# Patient Record
Sex: MALE | Race: WHITE | NOT HISPANIC OR LATINO
[De-identification: names, ages, dates, MRNs, and addresses within clinical notes are randomized per-mention and may not be internally consistent; named-entity substitution may affect disease eponyms.]

---

## 2017-03-24 ENCOUNTER — RX ONLY (RX ONLY)
Age: 62
End: 2017-03-24

## 2017-03-24 ENCOUNTER — DOCTOR'S OFFICE (OUTPATIENT)
Dept: URBAN - NONMETROPOLITAN AREA CLINIC 1 | Facility: CLINIC | Age: 62
Setting detail: OPHTHALMOLOGY
End: 2017-03-24
Payer: COMMERCIAL

## 2017-03-24 DIAGNOSIS — E11.9: ICD-10-CM

## 2017-03-24 DIAGNOSIS — H52.13: ICD-10-CM

## 2017-03-24 DIAGNOSIS — H25.013: ICD-10-CM

## 2017-03-24 DIAGNOSIS — H44.30: ICD-10-CM

## 2017-03-24 PROCEDURE — 92134 CPTRZ OPH DX IMG PST SGM RTA: CPT | Performed by: OPHTHALMOLOGY

## 2017-03-24 PROCEDURE — 92014 COMPRE OPH EXAM EST PT 1/>: CPT | Performed by: OPHTHALMOLOGY

## 2017-03-24 ASSESSMENT — REFRACTION_MANIFEST
OD_VA1: 20/
OD_VA2: 20/
OS_VA1: 20/
OD_VA3: 20/
OD_VA1: 20/
OU_VA: 20/
OD_VA2: 20/
OD_VA1: 20/
OS_VA3: 20/
OS_VA2: 20/
OD_VA2: 20/
OS_VA1: 20/
OS_VA1: 20/
OD_VA3: 20/
OS_VA2: 20/
OD_VA3: 20/
OS_VA2: 20/
OS_VA3: 20/
OU_VA: 20/
OU_VA: 20/
OS_VA3: 20/

## 2017-03-24 ASSESSMENT — CONFRONTATIONAL VISUAL FIELD TEST (CVF)
OS_FINDINGS: FULL
OD_FINDINGS: FULL

## 2017-03-24 ASSESSMENT — REFRACTION_AUTOREFRACTION
OS_CYLINDER: -0.75
OD_CYLINDER: -0.50
OS_AXIS: 59
OS_SPHERE: +0.25
OD_AXIS: 114
OD_SPHERE: +0.75

## 2017-03-24 ASSESSMENT — REFRACTION_CURRENTRX
OS_OVR_VA: 20/
OS_ADD: +1.75
OS_OVR_VA: 20/
OD_OVR_VA: 20/
OD_ADD: +1.75
OS_OVR_VA: 20/
OD_OVR_VA: 20/
OD_OVR_VA: 20/

## 2017-03-24 ASSESSMENT — VISUAL ACUITY
OD_BCVA: 20/25+2
OS_BCVA: 20/25+2

## 2017-03-24 ASSESSMENT — SPHEQUIV_DERIVED
OD_SPHEQUIV: 0.5
OS_SPHEQUIV: -0.125

## 2018-06-12 ENCOUNTER — DOCTOR'S OFFICE (OUTPATIENT)
Dept: URBAN - NONMETROPOLITAN AREA CLINIC 1 | Facility: CLINIC | Age: 63
Setting detail: OPHTHALMOLOGY
End: 2018-06-12
Payer: COMMERCIAL

## 2018-06-12 DIAGNOSIS — E11.9: ICD-10-CM

## 2018-06-12 DIAGNOSIS — H44.30: ICD-10-CM

## 2018-06-12 DIAGNOSIS — H25.013: ICD-10-CM

## 2018-06-12 DIAGNOSIS — E11.3293: ICD-10-CM

## 2018-06-12 PROCEDURE — 92014 COMPRE OPH EXAM EST PT 1/>: CPT | Performed by: OPHTHALMOLOGY

## 2018-06-12 ASSESSMENT — REFRACTION_MANIFEST
OS_VA3: 20/
OU_VA: 20/
OS_VA3: 20/
OS_VA2: 20/
OS_VA1: 20/
OD_VA3: 20/
OD_VA1: 20/
OU_VA: 20/
OD_VA2: 20/
OD_VA2: 20/
OU_VA: 20/
OD_VA3: 20/
OD_VA1: 20/
OS_VA1: 20/
OS_VA2: 20/
OD_VA2: 20/
OS_VA2: 20/
OS_VA1: 20/
OS_VA3: 20/
OD_VA1: 20/
OD_VA3: 20/

## 2018-06-12 ASSESSMENT — SPHEQUIV_DERIVED
OD_SPHEQUIV: 0.5
OS_SPHEQUIV: -0.125

## 2018-06-12 ASSESSMENT — REFRACTION_CURRENTRX
OD_OVR_VA: 20/
OD_ADD: +1.75
OS_OVR_VA: 20/
OS_ADD: +1.75
OS_OVR_VA: 20/
OD_OVR_VA: 20/
OS_OVR_VA: 20/
OD_OVR_VA: 20/

## 2018-06-12 ASSESSMENT — REFRACTION_AUTOREFRACTION
OS_CYLINDER: -0.75
OD_CYLINDER: -0.50
OD_AXIS: 114
OS_SPHERE: +0.25
OS_AXIS: 59
OD_SPHERE: +0.75

## 2018-06-12 ASSESSMENT — VISUAL ACUITY
OD_BCVA: 20/25-2
OS_BCVA: 20/25-2

## 2018-06-12 ASSESSMENT — CONFRONTATIONAL VISUAL FIELD TEST (CVF)
OD_FINDINGS: FULL
OS_FINDINGS: FULL

## 2019-06-14 ENCOUNTER — DOCTOR'S OFFICE (OUTPATIENT)
Dept: URBAN - NONMETROPOLITAN AREA CLINIC 1 | Facility: CLINIC | Age: 64
Setting detail: OPHTHALMOLOGY
End: 2019-06-14
Payer: COMMERCIAL

## 2019-06-14 DIAGNOSIS — H44.30: ICD-10-CM

## 2019-06-14 DIAGNOSIS — H25.013: ICD-10-CM

## 2019-06-14 DIAGNOSIS — E11.9: ICD-10-CM

## 2019-06-14 PROCEDURE — 92014 COMPRE OPH EXAM EST PT 1/>: CPT | Performed by: OPHTHALMOLOGY

## 2019-06-14 ASSESSMENT — REFRACTION_MANIFEST
OD_VA1: 20/
OS_VA1: 20/
OU_VA: 20/
OD_VA2: 20/
OD_VA1: 20/
OS_VA3: 20/
OD_VA2: 20/
OS_VA2: 20/
OU_VA: 20/
OD_VA3: 20/
OS_VA2: 20/
OS_VA3: 20/
OS_VA1: 20/
OD_VA3: 20/

## 2019-06-14 ASSESSMENT — REFRACTION_CURRENTRX
OS_ADD: +1.75
OS_OVR_VA: 20/
OD_ADD: +1.75
OD_OVR_VA: 20/

## 2019-06-14 ASSESSMENT — REFRACTION_AUTOREFRACTION
OS_AXIS: 077
OD_AXIS: 077
OD_CYLINDER: -0.50
OS_CYLINDER: -0.75
OD_SPHERE: +1.00
OS_SPHERE: +1.00

## 2019-06-14 ASSESSMENT — SPHEQUIV_DERIVED
OS_SPHEQUIV: 0.625
OD_SPHEQUIV: 0.75

## 2019-06-14 ASSESSMENT — VISUAL ACUITY
OD_BCVA: 20/25-1
OS_BCVA: 20/30+1

## 2019-06-14 ASSESSMENT — CONFRONTATIONAL VISUAL FIELD TEST (CVF)
OD_FINDINGS: FULL
OS_FINDINGS: FULL

## 2020-09-22 ENCOUNTER — DOCTOR'S OFFICE (OUTPATIENT)
Dept: URBAN - NONMETROPOLITAN AREA CLINIC 1 | Facility: CLINIC | Age: 65
Setting detail: OPHTHALMOLOGY
End: 2020-09-22
Payer: COMMERCIAL

## 2020-09-22 DIAGNOSIS — E11.3293: ICD-10-CM

## 2020-09-22 DIAGNOSIS — H25.013: ICD-10-CM

## 2020-09-22 DIAGNOSIS — H44.30: ICD-10-CM

## 2020-09-22 PROCEDURE — 92134 CPTRZ OPH DX IMG PST SGM RTA: CPT | Performed by: OPHTHALMOLOGY

## 2020-09-22 PROCEDURE — 92014 COMPRE OPH EXAM EST PT 1/>: CPT | Performed by: OPHTHALMOLOGY

## 2020-09-22 ASSESSMENT — SPHEQUIV_DERIVED
OD_SPHEQUIV: 1
OS_SPHEQUIV: 0

## 2020-09-22 ASSESSMENT — CONFRONTATIONAL VISUAL FIELD TEST (CVF)
OD_FINDINGS: FULL
OS_FINDINGS: FULL

## 2020-09-22 ASSESSMENT — REFRACTION_AUTOREFRACTION
OD_CYLINDER: -1.50
OS_AXIS: 88
OS_CYLINDER: -1.00
OD_AXIS: 97
OD_SPHERE: +1.75
OS_SPHERE: +0.50

## 2020-09-22 ASSESSMENT — VISUAL ACUITY
OD_BCVA: 20/20-2
OS_BCVA: 20/40+2

## 2020-09-22 ASSESSMENT — REFRACTION_CURRENTRX
OS_OVR_VA: 20/
OD_ADD: +1.75
OS_ADD: +1.75
OD_OVR_VA: 20/

## 2021-03-24 ENCOUNTER — DOCTOR'S OFFICE (OUTPATIENT)
Dept: URBAN - NONMETROPOLITAN AREA CLINIC 1 | Facility: CLINIC | Age: 66
Setting detail: OPHTHALMOLOGY
End: 2021-03-24
Payer: COMMERCIAL

## 2021-03-24 DIAGNOSIS — E11.9: ICD-10-CM

## 2021-03-24 DIAGNOSIS — H35.373: ICD-10-CM

## 2021-03-24 DIAGNOSIS — H44.30: ICD-10-CM

## 2021-03-24 DIAGNOSIS — H25.013: ICD-10-CM

## 2021-03-24 PROCEDURE — 92014 COMPRE OPH EXAM EST PT 1/>: CPT | Performed by: OPHTHALMOLOGY

## 2021-03-24 PROCEDURE — 92134 CPTRZ OPH DX IMG PST SGM RTA: CPT | Performed by: OPHTHALMOLOGY

## 2021-03-24 ASSESSMENT — REFRACTION_AUTOREFRACTION
OS_CYLINDER: -1.00
OD_AXIS: 97
OD_CYLINDER: -1.50
OS_AXIS: 88
OS_SPHERE: +0.50
OD_SPHERE: +1.75

## 2021-03-24 ASSESSMENT — REFRACTION_CURRENTRX
OD_ADD: +1.75
OS_ADD: +1.75
OD_OVR_VA: 20/
OS_OVR_VA: 20/

## 2021-03-24 ASSESSMENT — SPHEQUIV_DERIVED
OS_SPHEQUIV: 0
OD_SPHEQUIV: 1

## 2021-03-24 ASSESSMENT — VISUAL ACUITY
OD_BCVA: 20/20-2
OS_BCVA: 20/40+1

## 2021-03-24 ASSESSMENT — CONFRONTATIONAL VISUAL FIELD TEST (CVF)
OS_FINDINGS: FULL
OD_FINDINGS: FULL

## 2021-09-29 ENCOUNTER — DOCTOR'S OFFICE (OUTPATIENT)
Dept: URBAN - NONMETROPOLITAN AREA CLINIC 1 | Facility: CLINIC | Age: 66
Setting detail: OPHTHALMOLOGY
End: 2021-09-29
Payer: COMMERCIAL

## 2021-09-29 DIAGNOSIS — H35.373: ICD-10-CM

## 2021-09-29 DIAGNOSIS — H44.30: ICD-10-CM

## 2021-09-29 DIAGNOSIS — E11.9: ICD-10-CM

## 2021-09-29 DIAGNOSIS — H25.013: ICD-10-CM

## 2021-09-29 PROCEDURE — 92014 COMPRE OPH EXAM EST PT 1/>: CPT | Performed by: OPHTHALMOLOGY

## 2021-09-29 PROCEDURE — 92134 CPTRZ OPH DX IMG PST SGM RTA: CPT | Performed by: OPHTHALMOLOGY

## 2021-09-29 ASSESSMENT — KERATOMETRY
OD_K2POWER_DIOPTERS: 38.25
OS_AXISANGLE_DEGREES: 116
OD_K1POWER_DIOPTERS: 37.50
OS_K2POWER_DIOPTERS: 39.25
OS_K1POWER_DIOPTERS: 38.50
OD_AXISANGLE_DEGREES: 041

## 2021-09-29 ASSESSMENT — SPHEQUIV_DERIVED
OS_SPHEQUIV: 0.25
OD_SPHEQUIV: 1.5

## 2021-09-29 ASSESSMENT — REFRACTION_CURRENTRX
OS_OVR_VA: 20/
OD_OVR_VA: 20/
OS_ADD: +1.75
OD_ADD: +1.75

## 2021-09-29 ASSESSMENT — REFRACTION_AUTOREFRACTION
OS_SPHERE: +1.25
OS_CYLINDER: -2.00
OD_CYLINDER: -1.50
OD_AXIS: 114
OD_SPHERE: +2.25
OS_AXIS: 042

## 2021-09-29 ASSESSMENT — AXIALLENGTH_DERIVED
OD_AL: 25.17
OS_AL: 25.3099

## 2021-09-29 ASSESSMENT — CONFRONTATIONAL VISUAL FIELD TEST (CVF)
OD_FINDINGS: FULL
OS_FINDINGS: FULL

## 2021-09-29 ASSESSMENT — VISUAL ACUITY
OD_BCVA: 20/20-2
OS_BCVA: 20/30+1

## 2022-01-18 ENCOUNTER — DOCTOR'S OFFICE (OUTPATIENT)
Dept: URBAN - NONMETROPOLITAN AREA CLINIC 1 | Facility: CLINIC | Age: 67
Setting detail: OPHTHALMOLOGY
End: 2022-01-18
Payer: COMMERCIAL

## 2022-01-18 DIAGNOSIS — H35.373: ICD-10-CM

## 2022-01-18 DIAGNOSIS — H25.013: ICD-10-CM

## 2022-01-18 DIAGNOSIS — E11.9: ICD-10-CM

## 2022-01-18 DIAGNOSIS — H44.30: ICD-10-CM

## 2022-01-18 PROCEDURE — 99214 OFFICE O/P EST MOD 30 MIN: CPT | Performed by: OPHTHALMOLOGY

## 2022-01-18 PROCEDURE — 92134 CPTRZ OPH DX IMG PST SGM RTA: CPT | Performed by: OPHTHALMOLOGY

## 2022-01-18 ASSESSMENT — REFRACTION_AUTOREFRACTION
OS_CYLINDER: -1.00
OS_SPHERE: +1.75
OD_CYLINDER: -1.75
OD_AXIS: 115
OD_SPHERE: +2.00
OS_AXIS: 179

## 2022-01-18 ASSESSMENT — KERATOMETRY
OS_AXISANGLE_DEGREES: 116
OS_K1POWER_DIOPTERS: 38.50
OD_AXISANGLE_DEGREES: 041
OD_K2POWER_DIOPTERS: 38.25
OD_K1POWER_DIOPTERS: 37.50
OS_K2POWER_DIOPTERS: 39.25

## 2022-01-18 ASSESSMENT — VISUAL ACUITY
OS_BCVA: 20/40-2
OD_BCVA: 20/25

## 2022-01-18 ASSESSMENT — REFRACTION_CURRENTRX
OD_ADD: +1.75
OS_ADD: +1.75
OD_OVR_VA: 20/
OS_OVR_VA: 20/

## 2022-01-18 ASSESSMENT — AXIALLENGTH_DERIVED
OD_AL: 25.3403
OS_AL: 24.8693

## 2022-01-18 ASSESSMENT — CONFRONTATIONAL VISUAL FIELD TEST (CVF)
OD_FINDINGS: FULL
OS_FINDINGS: FULL

## 2022-01-18 ASSESSMENT — SPHEQUIV_DERIVED
OS_SPHEQUIV: 1.25
OD_SPHEQUIV: 1.125

## 2022-03-15 ENCOUNTER — DOCTOR'S OFFICE (OUTPATIENT)
Dept: URBAN - NONMETROPOLITAN AREA CLINIC 1 | Facility: CLINIC | Age: 67
Setting detail: OPHTHALMOLOGY
End: 2022-03-15
Payer: COMMERCIAL

## 2022-03-15 DIAGNOSIS — H25.011: ICD-10-CM

## 2022-03-15 PROCEDURE — 92136 OPHTHALMIC BIOMETRY: CPT | Performed by: OPHTHALMOLOGY

## 2022-03-24 ENCOUNTER — AMBUL SURGICAL CARE (OUTPATIENT)
Dept: URBAN - NONMETROPOLITAN AREA SURGERY 1 | Facility: SURGERY | Age: 67
Setting detail: OPHTHALMOLOGY
End: 2022-03-24
Payer: COMMERCIAL

## 2022-03-24 DIAGNOSIS — H25.13: ICD-10-CM

## 2022-03-24 PROCEDURE — MISC CHARG MISC CHARGE: Performed by: OPHTHALMOLOGY

## 2022-03-31 ENCOUNTER — AMBUL SURGICAL CARE (OUTPATIENT)
Dept: URBAN - NONMETROPOLITAN AREA SURGERY 1 | Facility: SURGERY | Age: 67
Setting detail: OPHTHALMOLOGY
End: 2022-03-31
Payer: COMMERCIAL

## 2022-03-31 DIAGNOSIS — H25.041: ICD-10-CM

## 2022-03-31 DIAGNOSIS — H25.11: ICD-10-CM

## 2022-03-31 DIAGNOSIS — H25.011: ICD-10-CM

## 2022-03-31 PROCEDURE — G8907 PT DOC NO EVENTS ON DISCHARG: HCPCS | Performed by: OPHTHALMOLOGY

## 2022-03-31 PROCEDURE — 66984 XCAPSL CTRC RMVL W/O ECP: CPT | Performed by: OPHTHALMOLOGY

## 2022-03-31 PROCEDURE — V2788 PRESBYOPIA-CORRECT FUNCTION: HCPCS | Performed by: OPHTHALMOLOGY

## 2022-03-31 PROCEDURE — G8918 PT W/O PREOP ORDER IV AB PRO: HCPCS | Performed by: OPHTHALMOLOGY

## 2022-04-01 ENCOUNTER — RX ONLY (RX ONLY)
Age: 67
End: 2022-04-01

## 2022-04-01 ENCOUNTER — DOCTOR'S OFFICE (OUTPATIENT)
Dept: URBAN - NONMETROPOLITAN AREA CLINIC 1 | Facility: CLINIC | Age: 67
Setting detail: OPHTHALMOLOGY
End: 2022-04-01

## 2022-04-01 ENCOUNTER — DOCTOR'S OFFICE (OUTPATIENT)
Dept: URBAN - NONMETROPOLITAN AREA CLINIC 1 | Facility: CLINIC | Age: 67
Setting detail: OPHTHALMOLOGY
End: 2022-04-01
Payer: COMMERCIAL

## 2022-04-01 DIAGNOSIS — H25.012: ICD-10-CM

## 2022-04-01 DIAGNOSIS — Z96.1: ICD-10-CM

## 2022-04-01 PROCEDURE — 99024 POSTOP FOLLOW-UP VISIT: CPT | Performed by: OPTOMETRIST

## 2022-04-01 PROCEDURE — 92136 OPHTHALMIC BIOMETRY: CPT | Performed by: OPHTHALMOLOGY

## 2022-04-01 ASSESSMENT — KERATOMETRY
OS_K1POWER_DIOPTERS: 38.50
OD_K1POWER_DIOPTERS: 37.50
OS_K2POWER_DIOPTERS: 39.25
OS_AXISANGLE_DEGREES: 116
OD_K2POWER_DIOPTERS: 38.25
OD_AXISANGLE_DEGREES: 041

## 2022-04-01 ASSESSMENT — VISUAL ACUITY
OD_BCVA: 20/25
OS_BCVA: 20/30

## 2022-04-01 ASSESSMENT — AXIALLENGTH_DERIVED
OS_AL: 24.8693
OD_AL: 25.7399

## 2022-04-01 ASSESSMENT — REFRACTION_AUTOREFRACTION
OS_CYLINDER: -1.00
OS_AXIS: 179
OD_AXIS: 28
OD_SPHERE: +0.50
OS_SPHERE: +1.75
OD_CYLINDER: -0.50

## 2022-04-01 ASSESSMENT — REFRACTION_CURRENTRX
OD_ADD: +1.75
OD_OVR_VA: 20/
OS_ADD: +1.75
OS_OVR_VA: 20/

## 2022-04-01 ASSESSMENT — SPHEQUIV_DERIVED
OS_SPHEQUIV: 1.25
OD_SPHEQUIV: 0.25

## 2022-04-01 ASSESSMENT — TONOMETRY: OD_IOP_MMHG: 18

## 2022-04-07 ENCOUNTER — AMBUL SURGICAL CARE (OUTPATIENT)
Dept: URBAN - NONMETROPOLITAN AREA SURGERY 1 | Facility: SURGERY | Age: 67
Setting detail: OPHTHALMOLOGY
End: 2022-04-07
Payer: COMMERCIAL

## 2022-04-07 DIAGNOSIS — H25.042: ICD-10-CM

## 2022-04-07 DIAGNOSIS — H25.12: ICD-10-CM

## 2022-04-07 DIAGNOSIS — H25.012: ICD-10-CM

## 2022-04-07 PROCEDURE — G8918 PT W/O PREOP ORDER IV AB PRO: HCPCS | Performed by: OPHTHALMOLOGY

## 2022-04-07 PROCEDURE — V2788 PRESBYOPIA-CORRECT FUNCTION: HCPCS | Performed by: OPHTHALMOLOGY

## 2022-04-07 PROCEDURE — 66984 XCAPSL CTRC RMVL W/O ECP: CPT | Performed by: OPHTHALMOLOGY

## 2022-04-07 PROCEDURE — G8907 PT DOC NO EVENTS ON DISCHARG: HCPCS | Performed by: OPHTHALMOLOGY

## 2022-04-08 ENCOUNTER — DOCTOR'S OFFICE (OUTPATIENT)
Dept: URBAN - NONMETROPOLITAN AREA CLINIC 1 | Facility: CLINIC | Age: 67
Setting detail: OPHTHALMOLOGY
End: 2022-04-08

## 2022-04-08 DIAGNOSIS — Z96.1: ICD-10-CM

## 2022-04-08 PROBLEM — H25.012 CATARACT CORTICAL SENILE; LEFT EYE: Status: RESOLVED | Noted: 2022-04-01 | Resolved: 2022-04-08

## 2022-04-08 PROCEDURE — 99024 POSTOP FOLLOW-UP VISIT: CPT | Performed by: OPTOMETRIST

## 2022-04-08 ASSESSMENT — REFRACTION_CURRENTRX
OD_ADD: +1.75
OS_ADD: +1.75
OD_OVR_VA: 20/
OS_OVR_VA: 20/

## 2022-04-08 ASSESSMENT — KERATOMETRY
OS_AXISANGLE_DEGREES: 116
OS_K1POWER_DIOPTERS: 38.50
OS_K2POWER_DIOPTERS: 39.25
OD_K1POWER_DIOPTERS: 37.50
OD_AXISANGLE_DEGREES: 041
OD_K2POWER_DIOPTERS: 38.25

## 2022-04-08 ASSESSMENT — VISUAL ACUITY
OS_BCVA: 20/20-2
OD_BCVA: 20/30

## 2022-04-08 ASSESSMENT — TONOMETRY
OD_IOP_MMHG: 14
OS_IOP_MMHG: 16

## 2022-04-08 ASSESSMENT — REFRACTION_AUTOREFRACTION
OD_SPHERE: +0.50
OD_AXIS: 097
OS_CYLINDER: +0.75
OS_SPHERE: 0.00
OS_AXIS: 069
OD_CYLINDER: -0.50

## 2022-04-08 ASSESSMENT — AXIALLENGTH_DERIVED
OD_AL: 25.7399
OS_AL: 25.254

## 2022-04-08 ASSESSMENT — SPHEQUIV_DERIVED
OS_SPHEQUIV: 0.375
OD_SPHEQUIV: 0.25

## 2022-04-20 ENCOUNTER — DOCTOR'S OFFICE (OUTPATIENT)
Dept: URBAN - NONMETROPOLITAN AREA CLINIC 1 | Facility: CLINIC | Age: 67
Setting detail: OPHTHALMOLOGY
End: 2022-04-20
Payer: COMMERCIAL

## 2022-04-20 ENCOUNTER — RX ONLY (RX ONLY)
Age: 67
End: 2022-04-20

## 2022-04-20 DIAGNOSIS — H35.373: ICD-10-CM

## 2022-04-20 DIAGNOSIS — Z96.1: ICD-10-CM

## 2022-04-20 PROCEDURE — 99024 POSTOP FOLLOW-UP VISIT: CPT | Performed by: OPHTHALMOLOGY

## 2022-04-20 PROCEDURE — 92134 CPTRZ OPH DX IMG PST SGM RTA: CPT | Performed by: OPHTHALMOLOGY

## 2022-04-20 ASSESSMENT — SPHEQUIV_DERIVED
OD_SPHEQUIV: 0.375
OS_SPHEQUIV: -0.375

## 2022-04-20 ASSESSMENT — TONOMETRY
OD_IOP_MMHG: 16
OS_IOP_MMHG: 17

## 2022-04-20 ASSESSMENT — REFRACTION_CURRENTRX
OS_ADD: +1.75
OS_OVR_VA: 20/
OD_OVR_VA: 20/
OD_ADD: +1.75

## 2022-04-20 ASSESSMENT — REFRACTION_AUTOREFRACTION
OS_AXIS: 046
OS_CYLINDER: -1.25
OD_AXIS: 072
OD_SPHERE: +0.75
OS_SPHERE: +0.25
OD_CYLINDER: -0.75

## 2022-04-20 ASSESSMENT — AXIALLENGTH_DERIVED
OD_AL: 25.682
OS_AL: 25.5933

## 2022-04-20 ASSESSMENT — VISUAL ACUITY
OD_BCVA: 20/30-1
OS_BCVA: 20/25-2

## 2022-04-20 ASSESSMENT — KERATOMETRY
OS_K2POWER_DIOPTERS: 39.25
OS_AXISANGLE_DEGREES: 116
OD_K1POWER_DIOPTERS: 37.50
OS_K1POWER_DIOPTERS: 38.50
OD_K2POWER_DIOPTERS: 38.25
OD_AXISANGLE_DEGREES: 041

## 2022-06-07 ENCOUNTER — RX ONLY (RX ONLY)
Age: 67
End: 2022-06-07

## 2022-06-07 ENCOUNTER — DOCTOR'S OFFICE (OUTPATIENT)
Dept: URBAN - NONMETROPOLITAN AREA CLINIC 1 | Facility: CLINIC | Age: 67
Setting detail: OPHTHALMOLOGY
End: 2022-06-07
Payer: COMMERCIAL

## 2022-06-07 DIAGNOSIS — E11.9: ICD-10-CM

## 2022-06-07 DIAGNOSIS — H35.373: ICD-10-CM

## 2022-06-07 DIAGNOSIS — Z96.1: ICD-10-CM

## 2022-06-07 PROBLEM — H52.13 LASIK; BOTH EYES: Status: ACTIVE | Noted: 2017-03-24

## 2022-06-07 PROBLEM — H44.30 MYOPIC DEGENERATION: Status: ACTIVE | Noted: 2017-03-24

## 2022-06-07 PROCEDURE — 99024 POSTOP FOLLOW-UP VISIT: CPT | Performed by: OPHTHALMOLOGY

## 2022-06-07 ASSESSMENT — SPHEQUIV_DERIVED
OD_SPHEQUIV: 0.375
OS_SPHEQUIV: -0.75

## 2022-06-07 ASSESSMENT — TONOMETRY
OD_IOP_MMHG: 10
OS_IOP_MMHG: 10

## 2022-06-07 ASSESSMENT — KERATOMETRY
OS_K2POWER_DIOPTERS: 39.25
OD_K1POWER_DIOPTERS: 37.50
OD_K2POWER_DIOPTERS: 38.25
OD_AXISANGLE_DEGREES: 041
OS_K1POWER_DIOPTERS: 38.50
OS_AXISANGLE_DEGREES: 116

## 2022-06-07 ASSESSMENT — REFRACTION_CURRENTRX
OS_ADD: +1.75
OD_ADD: +1.75
OD_OVR_VA: 20/
OS_OVR_VA: 20/

## 2022-06-07 ASSESSMENT — REFRACTION_AUTOREFRACTION
OD_AXIS: 31
OS_SPHERE: -0.25
OS_AXIS: 49
OS_CYLINDER: -1.00
OD_CYLINDER: -0.25
OD_SPHERE: +0.50

## 2022-06-07 ASSESSMENT — AXIALLENGTH_DERIVED
OD_AL: 25.682
OS_AL: 25.7664

## 2022-06-07 ASSESSMENT — VISUAL ACUITY
OS_BCVA: 20/25-1
OD_BCVA: 20/25-2

## 2022-12-14 ENCOUNTER — DOCTOR'S OFFICE (OUTPATIENT)
Dept: URBAN - NONMETROPOLITAN AREA CLINIC 1 | Facility: CLINIC | Age: 67
Setting detail: OPHTHALMOLOGY
End: 2022-12-14
Payer: COMMERCIAL

## 2022-12-14 DIAGNOSIS — E11.3293: ICD-10-CM

## 2022-12-14 DIAGNOSIS — E11.9: ICD-10-CM

## 2022-12-14 DIAGNOSIS — H35.373: ICD-10-CM

## 2022-12-14 DIAGNOSIS — Z79.84: ICD-10-CM

## 2022-12-14 DIAGNOSIS — H44.30: ICD-10-CM

## 2022-12-14 PROCEDURE — 92134 CPTRZ OPH DX IMG PST SGM RTA: CPT | Performed by: OPHTHALMOLOGY

## 2022-12-14 PROCEDURE — 99214 OFFICE O/P EST MOD 30 MIN: CPT | Performed by: OPHTHALMOLOGY

## 2022-12-14 ASSESSMENT — TONOMETRY
OS_IOP_MMHG: 10
OD_IOP_MMHG: 10

## 2022-12-14 ASSESSMENT — KERATOMETRY
OD_AXISANGLE_DEGREES: 041
OS_AXISANGLE_DEGREES: 116
OD_K2POWER_DIOPTERS: 38.25
OD_K1POWER_DIOPTERS: 37.50
OS_K2POWER_DIOPTERS: 39.25
OS_K1POWER_DIOPTERS: 38.50

## 2022-12-14 ASSESSMENT — CONFRONTATIONAL VISUAL FIELD TEST (CVF)
OS_FINDINGS: FULL
OD_FINDINGS: FULL

## 2022-12-14 ASSESSMENT — REFRACTION_CURRENTRX
OS_ADD: +1.75
OD_ADD: +1.75
OS_OVR_VA: 20/
OD_OVR_VA: 20/

## 2022-12-14 ASSESSMENT — VISUAL ACUITY
OS_BCVA: 20/25-1
OD_BCVA: 20/30-2

## 2022-12-14 ASSESSMENT — REFRACTION_AUTOREFRACTION
OD_SPHERE: +1.00
OS_CYLINDER: -0.75
OD_AXIS: 21
OS_AXIS: 44
OD_CYLINDER: -0.50
OS_SPHERE: +0.25

## 2022-12-14 ASSESSMENT — SPHEQUIV_DERIVED
OD_SPHEQUIV: 0.75
OS_SPHEQUIV: -0.125

## 2022-12-14 ASSESSMENT — AXIALLENGTH_DERIVED
OD_AL: 25.51
OS_AL: 25.4792

## 2023-02-09 ENCOUNTER — DOCTOR'S OFFICE (OUTPATIENT)
Dept: URBAN - NONMETROPOLITAN AREA CLINIC 1 | Facility: CLINIC | Age: 68
Setting detail: OPHTHALMOLOGY
End: 2023-02-09
Payer: OTHER MISCELLANEOUS

## 2023-02-09 DIAGNOSIS — Z02.71: ICD-10-CM

## 2023-02-09 PROCEDURE — MEDICAL RE MEDICAL RECORDS: Performed by: OPHTHALMOLOGY

## 2023-06-20 ENCOUNTER — DOCTOR'S OFFICE (OUTPATIENT)
Dept: URBAN - NONMETROPOLITAN AREA CLINIC 1 | Facility: CLINIC | Age: 68
Setting detail: OPHTHALMOLOGY
End: 2023-06-20
Payer: COMMERCIAL

## 2023-06-20 DIAGNOSIS — Z79.84: ICD-10-CM

## 2023-06-20 DIAGNOSIS — H44.30: ICD-10-CM

## 2023-06-20 DIAGNOSIS — Z96.1: ICD-10-CM

## 2023-06-20 DIAGNOSIS — E11.3293: ICD-10-CM

## 2023-06-20 DIAGNOSIS — H35.373: ICD-10-CM

## 2023-06-20 DIAGNOSIS — E11.9: ICD-10-CM

## 2023-06-20 PROCEDURE — 99213 OFFICE O/P EST LOW 20 MIN: CPT | Performed by: OPHTHALMOLOGY

## 2023-06-20 PROCEDURE — 92134 CPTRZ OPH DX IMG PST SGM RTA: CPT | Performed by: OPHTHALMOLOGY

## 2023-06-20 ASSESSMENT — SPHEQUIV_DERIVED
OD_SPHEQUIV: 0.75
OS_SPHEQUIV: -0.125

## 2023-06-20 ASSESSMENT — REFRACTION_AUTOREFRACTION
OS_CYLINDER: -0.75
OS_AXIS: 44
OD_SPHERE: +1.00
OS_SPHERE: +0.25
OD_AXIS: 21
OD_CYLINDER: -0.50

## 2023-06-20 ASSESSMENT — REFRACTION_CURRENTRX
OD_ADD: +1.75
OS_ADD: +1.75
OS_OVR_VA: 20/
OD_OVR_VA: 20/

## 2023-06-20 ASSESSMENT — KERATOMETRY
OD_K1POWER_DIOPTERS: 37.50
OS_K2POWER_DIOPTERS: 39.25
OS_AXISANGLE_DEGREES: 116
OS_K1POWER_DIOPTERS: 38.50
OD_AXISANGLE_DEGREES: 041
OD_K2POWER_DIOPTERS: 38.25

## 2023-06-20 ASSESSMENT — AXIALLENGTH_DERIVED
OD_AL: 25.51
OS_AL: 25.4792

## 2023-06-20 ASSESSMENT — CONFRONTATIONAL VISUAL FIELD TEST (CVF)
OD_FINDINGS: FULL
OS_FINDINGS: FULL

## 2023-06-20 ASSESSMENT — VISUAL ACUITY
OS_BCVA: 20/25-1
OD_BCVA: 20/40-1

## 2023-09-13 ENCOUNTER — HOSPITAL ENCOUNTER (INPATIENT)
Facility: HOSPITAL | Age: 68
LOS: 1 days | Discharge: ANOTHER HOSPITAL | DRG: 872 | End: 2023-09-14
Attending: EMERGENCY MEDICINE | Admitting: ANESTHESIOLOGY
Payer: MEDICARE

## 2023-09-13 ENCOUNTER — APPOINTMENT (EMERGENCY)
Dept: RADIOLOGY | Facility: HOSPITAL | Age: 68
DRG: 872 | End: 2023-09-13
Payer: MEDICARE

## 2023-09-13 DIAGNOSIS — D70.9 NEUTROPENIC FEVER: Primary | ICD-10-CM

## 2023-09-13 DIAGNOSIS — R50.81 NEUTROPENIC FEVER: Primary | ICD-10-CM

## 2023-09-13 LAB
ALBUMIN SERPL BCP-MCNC: 3.5 G/DL (ref 3.5–5)
ALP SERPL-CCNC: 155 U/L (ref 34–104)
ALT SERPL W P-5'-P-CCNC: 22 U/L (ref 7–52)
ANION GAP SERPL CALCULATED.3IONS-SCNC: 9 MMOL/L
APTT PPP: 24 SECONDS (ref 23–37)
AST SERPL W P-5'-P-CCNC: 10 U/L (ref 13–39)
ATRIAL RATE: 103 BPM
BACTERIA UR QL AUTO: NORMAL /HPF
BILIRUB SERPL-MCNC: 0.54 MG/DL (ref 0.2–1)
BILIRUB UR QL STRIP: NEGATIVE
BNP SERPL-MCNC: 90 PG/ML (ref 0–100)
BUN SERPL-MCNC: 16 MG/DL (ref 5–25)
CALCIUM SERPL-MCNC: 8.5 MG/DL (ref 8.4–10.2)
CHLORIDE SERPL-SCNC: 103 MMOL/L (ref 96–108)
CLARITY UR: ABNORMAL
CO2 SERPL-SCNC: 22 MMOL/L (ref 21–32)
COLOR UR: YELLOW
CREAT SERPL-MCNC: 0.86 MG/DL (ref 0.6–1.3)
ERYTHROCYTE [DISTWIDTH] IN BLOOD BY AUTOMATED COUNT: 15.8 % (ref 11.6–15.1)
FLUAV RNA RESP QL NAA+PROBE: NEGATIVE
FLUBV RNA RESP QL NAA+PROBE: NEGATIVE
GFR SERPL CREATININE-BSD FRML MDRD: 90 ML/MIN/1.73SQ M
GLUCOSE SERPL-MCNC: 201 MG/DL (ref 65–140)
GLUCOSE UR STRIP-MCNC: ABNORMAL MG/DL
HCT VFR BLD AUTO: 24 % (ref 36.5–49.3)
HGB BLD-MCNC: 7.9 G/DL (ref 12–17)
HGB UR QL STRIP.AUTO: NEGATIVE
INR PPP: 1.04 (ref 0.84–1.19)
KETONES UR STRIP-MCNC: ABNORMAL MG/DL
LACTATE SERPL-SCNC: 2.5 MMOL/L (ref 0.5–2)
LACTATE SERPL-SCNC: 2.6 MMOL/L (ref 0.5–2)
LEUKOCYTE ESTERASE UR QL STRIP: NEGATIVE
MCH RBC QN AUTO: 31.2 PG (ref 26.8–34.3)
MCHC RBC AUTO-ENTMCNC: 32.9 G/DL (ref 31.4–37.4)
MCV RBC AUTO: 95 FL (ref 82–98)
NITRITE UR QL STRIP: NEGATIVE
NON-SQ EPI CELLS URNS QL MICRO: NORMAL /HPF
P AXIS: 19 DEGREES
PH UR STRIP.AUTO: 5.5 [PH]
PLATELET # BLD AUTO: 24 THOUSANDS/UL (ref 149–390)
PMV BLD AUTO: 12.5 FL (ref 8.9–12.7)
POTASSIUM SERPL-SCNC: 4 MMOL/L (ref 3.5–5.3)
PR INTERVAL: 170 MS
PROCALCITONIN SERPL-MCNC: 0.53 NG/ML
PROT SERPL-MCNC: 5.9 G/DL (ref 6.4–8.4)
PROT UR STRIP-MCNC: ABNORMAL MG/DL
PROTHROMBIN TIME: 14 SECONDS (ref 11.6–14.5)
QRS AXIS: -44 DEGREES
QRSD INTERVAL: 104 MS
QT INTERVAL: 386 MS
QTC INTERVAL: 505 MS
RBC # BLD AUTO: 2.53 MILLION/UL (ref 3.88–5.62)
RBC #/AREA URNS AUTO: NORMAL /HPF
RSV RNA RESP QL NAA+PROBE: NEGATIVE
SARS-COV-2 RNA RESP QL NAA+PROBE: NEGATIVE
SODIUM SERPL-SCNC: 134 MMOL/L (ref 135–147)
SP GR UR STRIP.AUTO: 1.01 (ref 1–1.03)
T WAVE AXIS: 40 DEGREES
UROBILINOGEN UR QL STRIP.AUTO: 1 E.U./DL
VENTRICULAR RATE: 103 BPM
WBC # BLD AUTO: 0.14 THOUSAND/UL (ref 4.31–10.16)
WBC #/AREA URNS AUTO: NORMAL /HPF

## 2023-09-13 PROCEDURE — 99285 EMERGENCY DEPT VISIT HI MDM: CPT | Performed by: EMERGENCY MEDICINE

## 2023-09-13 PROCEDURE — 87154 CUL TYP ID BLD PTHGN 6+ TRGT: CPT

## 2023-09-13 PROCEDURE — 99284 EMERGENCY DEPT VISIT MOD MDM: CPT

## 2023-09-13 PROCEDURE — 85610 PROTHROMBIN TIME: CPT

## 2023-09-13 PROCEDURE — 96366 THER/PROPH/DIAG IV INF ADDON: CPT

## 2023-09-13 PROCEDURE — 83880 ASSAY OF NATRIURETIC PEPTIDE: CPT | Performed by: EMERGENCY MEDICINE

## 2023-09-13 PROCEDURE — 87040 BLOOD CULTURE FOR BACTERIA: CPT

## 2023-09-13 PROCEDURE — 96365 THER/PROPH/DIAG IV INF INIT: CPT

## 2023-09-13 PROCEDURE — 36415 COLL VENOUS BLD VENIPUNCTURE: CPT

## 2023-09-13 PROCEDURE — 85730 THROMBOPLASTIN TIME PARTIAL: CPT

## 2023-09-13 PROCEDURE — 87077 CULTURE AEROBIC IDENTIFY: CPT

## 2023-09-13 PROCEDURE — 83605 ASSAY OF LACTIC ACID: CPT

## 2023-09-13 PROCEDURE — 84145 PROCALCITONIN (PCT): CPT

## 2023-09-13 PROCEDURE — 71045 X-RAY EXAM CHEST 1 VIEW: CPT

## 2023-09-13 PROCEDURE — 85007 BL SMEAR W/DIFF WBC COUNT: CPT

## 2023-09-13 PROCEDURE — 81001 URINALYSIS AUTO W/SCOPE: CPT

## 2023-09-13 PROCEDURE — 80053 COMPREHEN METABOLIC PANEL: CPT

## 2023-09-13 PROCEDURE — 85027 COMPLETE CBC AUTOMATED: CPT

## 2023-09-13 PROCEDURE — 96368 THER/DIAG CONCURRENT INF: CPT

## 2023-09-13 PROCEDURE — 96367 TX/PROPH/DG ADDL SEQ IV INF: CPT

## 2023-09-13 PROCEDURE — 0241U HB NFCT DS VIR RESP RNA 4 TRGT: CPT | Performed by: EMERGENCY MEDICINE

## 2023-09-13 PROCEDURE — 93005 ELECTROCARDIOGRAM TRACING: CPT

## 2023-09-13 PROCEDURE — 87186 SC STD MICRODIL/AGAR DIL: CPT

## 2023-09-13 RX ORDER — FLECAINIDE ACETATE 50 MG/1
150 TABLET ORAL ONCE
Status: COMPLETED | OUTPATIENT
Start: 2023-09-13 | End: 2023-09-13

## 2023-09-13 RX ORDER — CEFEPIME HYDROCHLORIDE 2 G/50ML
2000 INJECTION, SOLUTION INTRAVENOUS ONCE
Status: COMPLETED | OUTPATIENT
Start: 2023-09-13 | End: 2023-09-13

## 2023-09-13 RX ORDER — SODIUM CHLORIDE, SODIUM GLUCONATE, SODIUM ACETATE, POTASSIUM CHLORIDE, MAGNESIUM CHLORIDE, SODIUM PHOSPHATE, DIBASIC, AND POTASSIUM PHOSPHATE .53; .5; .37; .037; .03; .012; .00082 G/100ML; G/100ML; G/100ML; G/100ML; G/100ML; G/100ML; G/100ML
1000 INJECTION, SOLUTION INTRAVENOUS ONCE
Status: COMPLETED | OUTPATIENT
Start: 2023-09-13 | End: 2023-09-13

## 2023-09-13 RX ORDER — ACYCLOVIR 200 MG/1
800 CAPSULE ORAL DAILY
Status: DISCONTINUED | OUTPATIENT
Start: 2023-09-14 | End: 2023-09-14 | Stop reason: HOSPADM

## 2023-09-13 RX ORDER — ACETAMINOPHEN 325 MG/1
650 TABLET ORAL ONCE
Status: COMPLETED | OUTPATIENT
Start: 2023-09-13 | End: 2023-09-13

## 2023-09-13 RX ORDER — LISINOPRIL 2.5 MG/1
2.5 TABLET ORAL ONCE
Status: COMPLETED | OUTPATIENT
Start: 2023-09-13 | End: 2023-09-13

## 2023-09-13 RX ORDER — SODIUM CHLORIDE, SODIUM GLUCONATE, SODIUM ACETATE, POTASSIUM CHLORIDE, MAGNESIUM CHLORIDE, SODIUM PHOSPHATE, DIBASIC, AND POTASSIUM PHOSPHATE .53; .5; .37; .037; .03; .012; .00082 G/100ML; G/100ML; G/100ML; G/100ML; G/100ML; G/100ML; G/100ML
150 INJECTION, SOLUTION INTRAVENOUS CONTINUOUS
Status: DISCONTINUED | OUTPATIENT
Start: 2023-09-13 | End: 2023-09-14

## 2023-09-13 RX ORDER — ATENOLOL 25 MG/1
25 TABLET ORAL ONCE
Status: COMPLETED | OUTPATIENT
Start: 2023-09-13 | End: 2023-09-13

## 2023-09-13 RX ADMIN — FLECAINIDE ACETATE 150 MG: 50 TABLET ORAL at 21:34

## 2023-09-13 RX ADMIN — SODIUM CHLORIDE, SODIUM GLUCONATE, SODIUM ACETATE, POTASSIUM CHLORIDE, MAGNESIUM CHLORIDE, SODIUM PHOSPHATE, DIBASIC, AND POTASSIUM PHOSPHATE 150 ML/HR: .53; .5; .37; .037; .03; .012; .00082 INJECTION, SOLUTION INTRAVENOUS at 20:11

## 2023-09-13 RX ADMIN — SODIUM CHLORIDE 1000 ML: 0.9 INJECTION, SOLUTION INTRAVENOUS at 19:11

## 2023-09-13 RX ADMIN — VANCOMYCIN HYDROCHLORIDE 2000 MG: 1 INJECTION, POWDER, LYOPHILIZED, FOR SOLUTION INTRAVENOUS at 19:40

## 2023-09-13 RX ADMIN — LISINOPRIL 2.5 MG: 2.5 TABLET ORAL at 21:34

## 2023-09-13 RX ADMIN — ATENOLOL 25 MG: 25 TABLET ORAL at 21:34

## 2023-09-13 RX ADMIN — ASPIRIN 81 MG: 81 TABLET, COATED ORAL at 21:35

## 2023-09-13 RX ADMIN — SODIUM CHLORIDE, SODIUM GLUCONATE, SODIUM ACETATE, POTASSIUM CHLORIDE, MAGNESIUM CHLORIDE, SODIUM PHOSPHATE, DIBASIC, AND POTASSIUM PHOSPHATE 1000 ML: .53; .5; .37; .037; .03; .012; .00082 INJECTION, SOLUTION INTRAVENOUS at 21:11

## 2023-09-13 RX ADMIN — CEFEPIME HYDROCHLORIDE 2000 MG: 2 INJECTION, SOLUTION INTRAVENOUS at 19:27

## 2023-09-13 RX ADMIN — ACETAMINOPHEN 650 MG: 325 TABLET, FILM COATED ORAL at 19:14

## 2023-09-13 NOTE — ED PROVIDER NOTES
History  Chief Complaint   Patient presents with   • Fever     Fever 100-101, dizziness starting today. Patient is a 62-year-old currently being treated for lymphoma presenting to the emergency room with his significant other reporting the patient had a temperature of 103 degrees today. Patient is currently undergoing chemotherapy at [facility]. He has recently been admitted to that facility. Patient also has a history of atrial fibrillation, diabetes, hypertension. Until recently patient was also on steroid therapy. Patient was recently admitted to [facility] for fever as well. Patient also has lesions on the right side of his chest which were thought to be zoster according to the patient's significant other. Patient had 2 cultures which they report were negative. Patient however is still being treated with acyclovir. Currently emergency room patient appears somewhat ill but not acutely toxic. He is tachycardic and febrile. 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 No pertinent family history. I have reviewed and agree with the history as documented.     E-Cigarette/Vaping   • E-Cigarette Use Never User      E-Cigarette/Vaping Substances   • Nicotine No    • THC No    • CBD No    • Flavoring No    • Other No    • Unknown No      Social History     Tobacco Use   • Smoking status: Never   • Smokeless tobacco: Never   Vaping Use   • Vaping Use: Never used   Substance Use Topics   • Alcohol use: Yes     Comment: socially   • Drug use: Never       Review of Systems   Constitutional: Positive for chills. HENT: Negative. Negative for sore throat and trouble swallowing. Respiratory: Positive for cough. Cardiovascular: Negative. Negative for chest pain. Gastrointestinal: Negative for abdominal pain, diarrhea, nausea and vomiting. Genitourinary: Negative. Negative for dysuria. Musculoskeletal: Negative for myalgias. Skin: Positive for rash. Allergic/Immunologic: Positive for immunocompromised state. Psychiatric/Behavioral: Positive for confusion. Physical Exam  Physical Exam  Vitals and nursing note reviewed. Constitutional:       General: He is in acute distress. Appearance: Normal appearance. He is well-developed. He is not ill-appearing or toxic-appearing. HENT:      Head: Normocephalic and atraumatic. Hair is normal.      Jaw: No pain on movement. Right Ear: External ear normal.      Left Ear: External ear normal.      Nose: Nose normal. No congestion. Mouth/Throat:      Mouth: Mucous membranes are moist.   Eyes:      General: Lids are normal. No scleral icterus. Extraocular Movements: Extraocular movements intact. Conjunctiva/sclera: Conjunctivae normal.      Pupils: Pupils are equal, round, and reactive to light. Cardiovascular:      Rate and Rhythm: Regular rhythm. Tachycardia present. Heart sounds: Normal heart sounds. No murmur heard. Pulmonary:      Effort: Pulmonary effort is normal. No respiratory distress. Breath sounds: Examination of the left-lower field reveals rhonchi. Rhonchi present. No decreased breath sounds, wheezing or rales. Chest:       Abdominal:      General: Abdomen is flat. There is no distension. Palpations: Abdomen is soft. Abdomen is not rigid. Tenderness:  There is no abdominal tenderness. There is no guarding or rebound. Musculoskeletal:         General: No swelling, tenderness, deformity or signs of injury. Normal range of motion. Cervical back: Normal range of motion and neck supple. Skin:     General: Skin is warm and dry. Coloration: Skin is not pale. Findings: Rash present. Neurological:      General: No focal deficit present. Mental Status: He is alert and oriented to person, place, and time. Mental status is at baseline.    Psychiatric:         Attention and Perception: Attention normal.         Mood and Affect: Mood normal.         Speech: Speech normal.         Behavior: Behavior normal.         Vital Signs  ED Triage Vitals   Temperature Pulse Respirations Blood Pressure SpO2   09/13/23 1856 09/13/23 1855 09/13/23 1855 09/13/23 1856 09/13/23 1855   (!) 102.9 °F (39.4 °C) (!) 107 18 162/74 97 %      Temp Source Heart Rate Source Patient Position - Orthostatic VS BP Location FiO2 (%)   09/13/23 1856 09/13/23 1915 09/13/23 1915 09/13/23 1915 --   Oral Monitor Lying Right arm       Pain Score       09/13/23 1855       No Pain           Vitals:    09/13/23 2000 09/13/23 2015 09/13/23 2030 09/13/23 2045   BP: 138/63 144/64 153/70 153/72   Pulse: 100 100 105 103   Patient Position - Orthostatic VS: Lying Lying Lying Lying         Visual Acuity      ED Medications  Medications   multi-electrolyte (PLASMALYTE-A/ISOLYTE-S PH 7.4) IV solution (150 mL/hr Intravenous New Bag 9/13/23 2011)   vancomycin (VANCOCIN) 2,000 mg in sodium chloride 0.9 % 500 mL IVPB (2,000 mg Intravenous New Bag 9/13/23 1940)   multi-electrolyte (ISOLYTE-S PH 7.4) bolus 1,000 mL (has no administration in time range)   sodium chloride 0.9 % bolus 1,000 mL (0 mL Intravenous Stopped 9/13/23 2011)   acetaminophen (TYLENOL) tablet 650 mg (650 mg Oral Given 9/13/23 1914)   cefepime (MAXIPIME) IVPB (premix in dextrose) 2,000 mg 50 mL (0 mg Intravenous Stopped 9/13/23 1943) Diagnostic Studies  Results Reviewed     Procedure Component Value Units Date/Time    B-Type Natriuretic Peptide(BNP) [[id number]]     Lab Status: No result Specimen: Blood     Comprehensive metabolic panel [[id number]]  (Abnormal) Collected: 09/13/23 1908    Lab Status: Final result Specimen: Blood from Arm, Left Updated: 09/13/23 2009     Sodium 134 mmol/L      Potassium 4.0 mmol/L      Chloride 103 mmol/L      CO2 22 mmol/L      ANION GAP 9 mmol/L      BUN 16 mg/dL      Creatinine 0.86 mg/dL      Glucose 201 mg/dL      Calcium 8.5 mg/dL      AST 10 U/L      ALT 22 U/L      Alkaline Phosphatase 155 U/L      Total Protein 5.9 g/dL      Albumin 3.5 g/dL      Total Bilirubin 0.54 mg/dL      eGFR 90 ml/min/1.73sq m     Narrative:      [facility] guidelines for Chronic Kidney Disease (CKD):   •  Stage 1 with normal or high GFR (GFR > 90 mL/min/1.73 square meters)  •  Stage 2 Mild CKD (GFR = 60-89 mL/min/1.73 square meters)  •  Stage 3A Moderate CKD (GFR = 45-59 mL/min/1.73 square meters)  •  Stage 3B Moderate CKD (GFR = 30-44 mL/min/1.73 square meters)  •  Stage 4 Severe CKD (GFR = 15-29 mL/min/1.73 square meters)  •  Stage 5 End Stage CKD (GFR <15 mL/min/1.73 square meters)  Note: GFR calculation is accurate only with a steady state creatinine    Procalcitonin [[id number]]  (Abnormal) Collected: 09/13/23 1908    Lab Status: Final result Specimen: Blood from Arm, Left Updated: 09/13/23 2009     Procalcitonin 0.53 ng/ml     Urine Microscopic [[id number]]  (Normal) Collected: 09/13/23 1924    Lab Status: Final result Specimen: Urine, Clean Catch Updated: 09/13/23 2004     RBC, UA 0-1 /hpf      WBC, UA 0-1 /hpf      Epithelial Cells Occasional /hpf      Bacteria, UA Occasional /hpf     Lactic acid, plasma (w/reflex if result > 2.0) [[id number]]  (Abnormal) Collected: 09/13/23 1908    Lab Status: Final result Specimen: Blood from Arm, Left Updated: 09/13/23 2002     LACTIC ACID 2.6 mmol/L Narrative:      Result may be elevated if tourniquet was used during collection. Lactic acid 2 Hours [[id number]]     Lab Status: No result Specimen: Blood     CBC and differential [[id number]]  (Abnormal) Collected: 09/13/23 1908    Lab Status: Final result Specimen: Blood from Arm, Left Updated: 09/13/23 1959     WBC 0.14 Thousand/uL      RBC 2.53 Million/uL      Hemoglobin 7.9 g/dL      Hematocrit 24.0 %      MCV 95 fL      MCH 31.2 pg      MCHC 32.9 g/dL      RDW 15.8 %      MPV 12.5 fL      Platelets 24 Thousands/uL     Manual Differential(PHLEBS Do Not Order) [[id number]] Collected: 09/13/23 1908    Lab Status: In process Specimen: Blood from Arm, Left Updated: 09/13/23 1956    UA w Reflex to Microscopic w Reflex to Culture [[id number]]  (Abnormal) Collected: 09/13/23 1924    Lab Status: Final result Specimen: Urine, Clean Catch Updated: 09/13/23 1939     Color, UA Yellow     Clarity, UA Slightly Cloudy     Specific Gravity, UA 1.015     pH, UA 5.5     Leukocytes, UA Negative     Nitrite, UA Negative     Protein, UA Trace mg/dl      Glucose,  (1/4%) mg/dl      Ketones, UA Trace mg/dl      Urobilinogen, UA 1.0 E.U./dl      Bilirubin, UA Negative     Occult Blood, UA Negative    Protime-INR [[id number]]  (Normal) Collected: 09/13/23 1908    Lab Status: Final result Specimen: Blood from Arm, Left Updated: 09/13/23 1936     Protime 14.0 seconds      INR 1.04    APTT [[id number]]  (Normal) Collected: 09/13/23 1908    Lab Status: Final result Specimen: Blood from Arm, Left Updated: 09/13/23 1936     PTT 24 seconds     Blood culture #1 [[id number]] Collected: 09/13/23 1908    Lab Status: In process Specimen: Blood from Arm, Right Updated: 09/13/23 1916    Blood culture #2 [[id number]] Collected: 09/13/23 1908    Lab Status: In process Specimen: Blood from Arm, Left Updated: 09/13/23 1916    FLU/RSV/COVID - if FLU/RSV clinically relevant [[id number]] Collected: 09/13/23 1908    Lab Status:  In process Specimen: Nares from Nose Updated: 09/13/23 1916                 XR chest 1 view portable    (Results Pending)              Procedures  ECG 12 Lead Documentation Only    Date/Time: 9/13/2023 7:22 PM    Performed by: Vu Garner DO  Authorized by: Vu Garner DO    ECG reviewed by me, the ED Provider: yes    Patient location:  ED  Previous ECG:     Previous ECG:  Unavailable  Interpretation:     Interpretation: normal    Rate:     ECG rate assessment: tachycardic    Rhythm:     Rhythm: sinus tachycardia    Ectopy:     Ectopy: none    QRS:     QRS axis:  Normal  Conduction:     Conduction: normal    ST segments:     ST segments:  Non-specific  T waves:     T waves: non-specific    Other findings:     Other findings: prolonged qTc interval    Comments:      QTc on previously documented EKG was 465. ED Course  ED Course as of 09/13/23 2059   Wed Sep 13, 2023   1958 Has white blood cell count reported is 4.6 on 31 August.   2003 Patient will require admission. Patient will likely require services that are not readily available at our facility such as hematology oncology and onsite infectious disease. Discussed this with patient and his significant other. They are agreeable with transfer to [facility].   2039 8/31 hemoglobin was 8.8   2054 Spoke to hematology oncology fellow at [facility].  They agreed the patient should be transferred to that facility. We are awaiting to hear from medicine service. 2057 Patient's BMI > 30. For purposes of fluid resuscitation, IBW was utilized to calculate target volume to be administered. 2059 We will continue to volume resuscitate the patient. Patient was signed out to Dr. Vj Mendez 20yo+    Flowsheet Row Most Recent Value   Initial Alcohol Screen: US AUDIT-C     1. How often do you have a drink containing alcohol? 0 Filed at: 09/13/2023 1915   2.  How many drinks containing alcohol do you have on a typical day you are drinking? 0 Filed at: 09/13/2023 1915   3a. Male UNDER 65: How often do you have five or more drinks on one occasion? 0 Filed at: 09/13/2023 1858   3b. FEMALE Any Age, or MALE 65+: How often do you have 4 or more drinks on one occassion? 0 Filed at: 09/13/2023 1915   Audit-C Score 0 Filed at: 09/13/2023 9236   LILLY: How many times in the past year have you. .. Used an illegal drug or used a prescription medication for non-medical reasons? Never Filed at: 09/13/2023 4957                    Medical Decision Making  Patient presented to the emergency department and a MSE was performed. The patient was evaluated for complaint related to acute  fever. Patient is potentially at risk for, but not limited to, pneumonia, urinary tract infection, cholecystitis, appendicitis, diverticulitis,cellulitis, otitis media, strep pharyngitis, meningitis, neutropenic fever or uncomplicated viral related illness. Several of these diagnoses have been evaluated and ruled out by history and physical.  As needed, patient will be further evaluated with laboratory and imaging studies. Higher level diagnostics, such as CT imaging or ultrasound, may also be required. Please see work-up portion of the note for further evaluation of patient's risk. Socioeconomic factors were also considered as part of the decision-making process. Unless otherwise stated in the chart or patient is admitted as elsewhere documented, any previously prescribed medications will be maintained. Neutropenic fever ([address]): acute illness or injury  Amount and/or Complexity of Data Reviewed  Independent Historian: spouse     Details: [place] partner provides additional history. External Data Reviewed: labs and notes. Labs:  Decision-making details documented in ED Course. Radiology: ordered. ECG/medicine tests: ordered and independent interpretation performed. Decision-making details documented in ED Course.       Risk  Prescription drug management. Risk Details: Patient presented to the emergency department and a MSE was performed. The patient was evaluated and diagnosed with acute febrile illness in an immunocompromised patient with a white blood cell count of 0.14. This is a new issue that will require additional planned work-up and treatment in a hospitalized setting. As may have been required as part of this evaluation, clinical laboratory test, radiology imaging and medical testing (I.e. EKG) were ordered as necessitated by the patient's presentation. I independently reviewed these studies, imaging and testing. This patient's case is considered to be a considerable risk secondary to the above listed disease process and poses a threat to the patient's well-being and baseline function. Further in-patient diagnostic testing and management, which may include the administration of parenteral medications, is required. Pt required transfer to [facility] secondary to the need for continuity of care, hematology oncology services and infectious disease services. Patient presented to the ED and was found to be critically ill as demonstrated by the clinical history and primary physical evaluation. Pt had demonstrative findings and / or derangements of vital signs indicative for severe illness or injury. I personally performed bedside history and evaluation. Interventions to address these clinical needs were ordered/performed. These included, but not necessarily limited to, the ordering and subsequent review of lab studies, imaging and EKG. Please see chart with regards to specific resuscitative interventions and diagnostics. Due to a probability of clinically significant, life or limb threatening condition, the patient required my highest level of care, intervention and attention. I personally spent the documented time directly managing the patient.  The critical care time included obtaining a history, examining the patient, ordering and review of studies, arranging urgent treatment with development of a management plan, evaluation of patient's response to treatment, reassessment, and, if warranted, discussions with other providers or consultants. Documentation to the medical record for continuity of care was also required. Patients records pertinent to the emergent presenting condition were reviewed as available. Family was updated as available and appropriate. This critical care time was performed to assess and manage the high probability of imminent, life-threatening deterioration that could result in multi-organ failure if not addressed. It was exclusive of separately billable procedures and treating other patients and teaching time. Please see MDM section and the rest of the note for further information on patient assessment, reassessment, interventions and treatment. Total time was 33 mins exclusive of separate billable procedures. Disposition  Final diagnoses:   Neutropenic fever ([address])     Time reflects when diagnosis was documented in both MDM as applicable and the Disposition within this note     Time User Action Codes Description Comment    9/13/2023  7:58 PM Juan José Dueñas Add [D70.9,  R50.81] Neutropenic fever [facility])       ED Disposition     ED Disposition   Transfer to Another [address] [place] [address] of Network    Condition   --    Date/Time   Wed Sep 13, 2023  8:02 PM    Comment   Mel Letters should be transferred out to [facility].           Follow-up Information    None         Patient's Medications    No medications on file       No discharge procedures on file.     PDMP Review     None          ED Provider  Electronically Signed by           Kahlil Marin DO  09/13/23 2059

## 2023-09-14 VITALS
TEMPERATURE: 98.3 F | HEART RATE: 86 BPM | SYSTOLIC BLOOD PRESSURE: 120 MMHG | OXYGEN SATURATION: 97 % | BODY MASS INDEX: 34.97 KG/M2 | HEIGHT: 73 IN | WEIGHT: 263.89 LBS | DIASTOLIC BLOOD PRESSURE: 57 MMHG | RESPIRATION RATE: 18 BRPM

## 2023-09-14 PROBLEM — D70.9 NEUTROPENIC FEVER: Status: ACTIVE | Noted: 2023-09-14

## 2023-09-14 PROBLEM — B02.9 HERPES ZOSTER: Status: ACTIVE | Noted: 2023-09-14

## 2023-09-14 PROBLEM — E11.9 DM II (DIABETES MELLITUS, TYPE II), CONTROLLED (HCC): Status: ACTIVE | Noted: 2023-09-14

## 2023-09-14 PROBLEM — A41.9 SEPSIS (HCC): Status: ACTIVE | Noted: 2023-09-14

## 2023-09-14 PROBLEM — G62.9 POLYNEUROPATHY: Status: ACTIVE | Noted: 2023-09-14

## 2023-09-14 PROBLEM — C88.0 WALDENSTROM MACROGLOBULINEMIA (HCC): Status: ACTIVE | Noted: 2023-09-14

## 2023-09-14 PROBLEM — I10 HYPERTENSION: Status: ACTIVE | Noted: 2023-09-14

## 2023-09-14 PROBLEM — C83.30 DIFFUSE LARGE B CELL LYMPHOMA (HCC): Status: ACTIVE | Noted: 2023-09-14

## 2023-09-14 PROBLEM — I48.0 PAROXYSMAL ATRIAL FIBRILLATION (HCC): Status: ACTIVE | Noted: 2023-09-14

## 2023-09-14 PROBLEM — R50.81 NEUTROPENIC FEVER: Status: ACTIVE | Noted: 2023-09-14

## 2023-09-14 LAB
ALBUMIN SERPL BCP-MCNC: 2.9 G/DL (ref 3.5–5)
ALP SERPL-CCNC: 106 U/L (ref 34–104)
ALT SERPL W P-5'-P-CCNC: 14 U/L (ref 7–52)
ANION GAP SERPL CALCULATED.3IONS-SCNC: 6 MMOL/L
ANISOCYTOSIS BLD QL SMEAR: PRESENT
AST SERPL W P-5'-P-CCNC: 7 U/L (ref 13–39)
BASOPHILS # BLD MANUAL: 0 THOUSAND/UL (ref 0–0.1)
BASOPHILS NFR MAR MANUAL: 0 % (ref 0–1)
BILIRUB SERPL-MCNC: 0.41 MG/DL (ref 0.2–1)
BUN SERPL-MCNC: 17 MG/DL (ref 5–25)
BURR CELLS BLD QL SMEAR: PRESENT
CALCIUM ALBUM COR SERPL-MCNC: 8.9 MG/DL (ref 8.3–10.1)
CALCIUM SERPL-MCNC: 8 MG/DL (ref 8.4–10.2)
CHLORIDE SERPL-SCNC: 102 MMOL/L (ref 96–108)
CO2 SERPL-SCNC: 23 MMOL/L (ref 21–32)
CREAT SERPL-MCNC: 0.8 MG/DL (ref 0.6–1.3)
DACRYOCYTES BLD QL SMEAR: PRESENT
EOSINOPHIL # BLD MANUAL: 0.01 THOUSAND/UL (ref 0–0.4)
EOSINOPHIL NFR BLD MANUAL: 5 % (ref 0–6)
ERYTHROCYTE [DISTWIDTH] IN BLOOD BY AUTOMATED COUNT: 16.5 % (ref 11.6–15.1)
GFR SERPL CREATININE-BSD FRML MDRD: 93 ML/MIN/1.73SQ M
GLUCOSE SERPL-MCNC: 187 MG/DL (ref 65–140)
GLUCOSE SERPL-MCNC: 213 MG/DL (ref 65–140)
HCT VFR BLD AUTO: 18.8 % (ref 36.5–49.3)
HGB BLD-MCNC: 6 G/DL (ref 12–17)
LYMPHOCYTES # BLD AUTO: 0.08 THOUSAND/UL (ref 0.6–4.47)
LYMPHOCYTES # BLD AUTO: 59 % (ref 14–44)
MAGNESIUM SERPL-MCNC: 1.6 MG/DL (ref 1.9–2.7)
MCH RBC QN AUTO: 31.3 PG (ref 26.8–34.3)
MCHC RBC AUTO-ENTMCNC: 31.9 G/DL (ref 31.4–37.4)
MCV RBC AUTO: 98 FL (ref 82–98)
METAMYELOCYTES NFR BLD MANUAL: 1 % (ref 0–1)
MONOCYTES # BLD AUTO: 0.04 THOUSAND/UL (ref 0–1.22)
MONOCYTES NFR BLD: 28 % (ref 4–12)
NEUTROPHILS # BLD MANUAL: 0.01 THOUSAND/UL (ref 1.85–7.62)
NEUTS SEG NFR BLD AUTO: 7 % (ref 43–75)
NRBC BLD AUTO-RTO: 0 /100 WBCS
NRBC BLD AUTO-RTO: 3 /100 WBC (ref 0–2)
OVALOCYTES BLD QL SMEAR: PRESENT
PHOSPHATE SERPL-MCNC: 3.2 MG/DL (ref 2.3–4.1)
PLATELET # BLD AUTO: 25 THOUSANDS/UL (ref 149–390)
PLATELET BLD QL SMEAR: ABNORMAL
POIKILOCYTOSIS BLD QL SMEAR: PRESENT
POLYCHROMASIA BLD QL SMEAR: PRESENT
POTASSIUM SERPL-SCNC: 4 MMOL/L (ref 3.5–5.3)
PROCALCITONIN SERPL-MCNC: 6.14 NG/ML
PROT SERPL-MCNC: 5 G/DL (ref 6.4–8.4)
RBC # BLD AUTO: 1.92 MILLION/UL (ref 3.88–5.62)
RBC MORPH BLD: PRESENT
SCHISTOCYTES BLD QL SMEAR: PRESENT
SODIUM SERPL-SCNC: 131 MMOL/L (ref 135–147)
WBC # BLD AUTO: 0.31 THOUSAND/UL (ref 4.31–10.16)

## 2023-09-14 PROCEDURE — 96375 TX/PRO/DX INJ NEW DRUG ADDON: CPT

## 2023-09-14 PROCEDURE — 36415 COLL VENOUS BLD VENIPUNCTURE: CPT | Performed by: ANESTHESIOLOGY

## 2023-09-14 PROCEDURE — 80053 COMPREHEN METABOLIC PANEL: CPT | Performed by: ANESTHESIOLOGY

## 2023-09-14 PROCEDURE — 99284 EMERGENCY DEPT VISIT MOD MDM: CPT | Performed by: ANESTHESIOLOGY

## 2023-09-14 PROCEDURE — 82948 REAGENT STRIP/BLOOD GLUCOSE: CPT

## 2023-09-14 PROCEDURE — 96366 THER/PROPH/DIAG IV INF ADDON: CPT

## 2023-09-14 PROCEDURE — 96372 THER/PROPH/DIAG INJ SC/IM: CPT

## 2023-09-14 PROCEDURE — C9113 INJ PANTOPRAZOLE SODIUM, VIA: HCPCS | Performed by: EMERGENCY MEDICINE

## 2023-09-14 PROCEDURE — 84145 PROCALCITONIN (PCT): CPT | Performed by: ANESTHESIOLOGY

## 2023-09-14 PROCEDURE — 85027 COMPLETE CBC AUTOMATED: CPT | Performed by: ANESTHESIOLOGY

## 2023-09-14 PROCEDURE — 96368 THER/DIAG CONCURRENT INF: CPT

## 2023-09-14 PROCEDURE — 84100 ASSAY OF PHOSPHORUS: CPT | Performed by: ANESTHESIOLOGY

## 2023-09-14 PROCEDURE — 83735 ASSAY OF MAGNESIUM: CPT | Performed by: ANESTHESIOLOGY

## 2023-09-14 PROCEDURE — NC001 PR NO CHARGE: Performed by: ANESTHESIOLOGY

## 2023-09-14 RX ORDER — CEFEPIME HYDROCHLORIDE 2 G/50ML
2000 INJECTION, SOLUTION INTRAVENOUS EVERY 8 HOURS
Status: DISCONTINUED | OUTPATIENT
Start: 2023-09-14 | End: 2023-09-14 | Stop reason: HOSPADM

## 2023-09-14 RX ORDER — METRONIDAZOLE 500 MG/100ML
500 INJECTION, SOLUTION INTRAVENOUS EVERY 8 HOURS
Status: DISCONTINUED | OUTPATIENT
Start: 2023-09-14 | End: 2023-09-14 | Stop reason: HOSPADM

## 2023-09-14 RX ORDER — SIMVASTATIN 20 MG
20 TABLET ORAL
COMMUNITY
Start: 2023-05-01

## 2023-09-14 RX ORDER — PANTOPRAZOLE SODIUM 40 MG/10ML
40 INJECTION, POWDER, LYOPHILIZED, FOR SOLUTION INTRAVENOUS ONCE
Status: COMPLETED | OUTPATIENT
Start: 2023-09-14 | End: 2023-09-14

## 2023-09-14 RX ORDER — INSULIN GLARGINE 100 [IU]/ML
27 INJECTION, SOLUTION SUBCUTANEOUS ONCE
Status: DISCONTINUED | OUTPATIENT
Start: 2023-09-14 | End: 2023-09-14

## 2023-09-14 RX ORDER — FUROSEMIDE 10 MG/ML
20 INJECTION INTRAMUSCULAR; INTRAVENOUS ONCE
Status: DISCONTINUED | OUTPATIENT
Start: 2023-09-14 | End: 2023-09-14 | Stop reason: HOSPADM

## 2023-09-14 RX ORDER — CEFEPIME HYDROCHLORIDE 2 G/50ML
2000 INJECTION, SOLUTION INTRAVENOUS ONCE
Status: COMPLETED | OUTPATIENT
Start: 2023-09-14 | End: 2023-09-14

## 2023-09-14 RX ORDER — SENNOSIDES 8.6 MG
1 TABLET ORAL ONCE
Status: COMPLETED | OUTPATIENT
Start: 2023-09-14 | End: 2023-09-14

## 2023-09-14 RX ORDER — ACYCLOVIR 800 MG/1
800 TABLET ORAL 2 TIMES DAILY
COMMUNITY
Start: 2023-09-05 | End: 2023-09-14

## 2023-09-14 RX ORDER — INSULIN GLARGINE 100 [IU]/ML
28 INJECTION, SOLUTION SUBCUTANEOUS ONCE
Status: COMPLETED | OUTPATIENT
Start: 2023-09-14 | End: 2023-09-14

## 2023-09-14 RX ORDER — TAMSULOSIN HYDROCHLORIDE 0.4 MG/1
0.4 CAPSULE ORAL
Status: DISCONTINUED | OUTPATIENT
Start: 2023-09-14 | End: 2023-09-14

## 2023-09-14 RX ORDER — HEPARIN SODIUM 5000 [USP'U]/ML
5000 INJECTION, SOLUTION INTRAVENOUS; SUBCUTANEOUS EVERY 8 HOURS SCHEDULED
Status: DISCONTINUED | OUTPATIENT
Start: 2023-09-14 | End: 2023-09-14 | Stop reason: HOSPADM

## 2023-09-14 RX ORDER — ATENOLOL 25 MG/1
1 TABLET ORAL 2 TIMES DAILY
COMMUNITY
Start: 2023-08-11

## 2023-09-14 RX ORDER — PANTOPRAZOLE SODIUM 40 MG/1
1 TABLET, DELAYED RELEASE ORAL DAILY
COMMUNITY
Start: 2023-08-11

## 2023-09-14 RX ORDER — MAGNESIUM SULFATE HEPTAHYDRATE 40 MG/ML
2 INJECTION, SOLUTION INTRAVENOUS ONCE
Status: DISCONTINUED | OUTPATIENT
Start: 2023-09-14 | End: 2023-09-14 | Stop reason: HOSPADM

## 2023-09-14 RX ORDER — CHLORHEXIDINE GLUCONATE ORAL RINSE 1.2 MG/ML
15 SOLUTION DENTAL EVERY 12 HOURS SCHEDULED
Status: DISCONTINUED | OUTPATIENT
Start: 2023-09-14 | End: 2023-09-14 | Stop reason: HOSPADM

## 2023-09-14 RX ORDER — FLECAINIDE ACETATE 50 MG/1
150 TABLET ORAL ONCE
Status: COMPLETED | OUTPATIENT
Start: 2023-09-14 | End: 2023-09-14

## 2023-09-14 RX ORDER — FLECAINIDE ACETATE 150 MG/1
1 TABLET ORAL 2 TIMES DAILY
COMMUNITY
Start: 2023-08-11

## 2023-09-14 RX ORDER — AMOXICILLIN 250 MG
1 CAPSULE ORAL 2 TIMES DAILY
COMMUNITY
Start: 2023-08-05 | End: 2023-09-14

## 2023-09-14 RX ORDER — INSULIN GLARGINE 100 [IU]/ML
28 INJECTION, SOLUTION SUBCUTANEOUS
COMMUNITY
End: 2023-09-14

## 2023-09-14 RX ORDER — ASPIRIN 81 MG/1
81 TABLET ORAL DAILY
COMMUNITY

## 2023-09-14 RX ORDER — ATENOLOL 25 MG/1
25 TABLET ORAL ONCE
Status: COMPLETED | OUTPATIENT
Start: 2023-09-14 | End: 2023-09-14

## 2023-09-14 RX ORDER — LISINOPRIL 2.5 MG/1
2.5 TABLET ORAL DAILY
COMMUNITY
Start: 2023-08-06 | End: 2024-08-05

## 2023-09-14 RX ADMIN — STANDARDIZED SENNA CONCENTRATE 8.6 MG: 8.6 TABLET ORAL at 09:03

## 2023-09-14 RX ADMIN — ASPIRIN 81 MG: 81 TABLET, COATED ORAL at 09:03

## 2023-09-14 RX ADMIN — ATENOLOL 25 MG: 25 TABLET ORAL at 09:05

## 2023-09-14 RX ADMIN — CEFEPIME HYDROCHLORIDE 2000 MG: 2 INJECTION, SOLUTION INTRAVENOUS at 09:10

## 2023-09-14 RX ADMIN — PANTOPRAZOLE SODIUM 40 MG: 40 INJECTION, POWDER, FOR SOLUTION INTRAVENOUS at 09:04

## 2023-09-14 RX ADMIN — FLECAINIDE ACETATE 150 MG: 50 TABLET ORAL at 09:03

## 2023-09-14 RX ADMIN — ACYCLOVIR 800 MG: 200 CAPSULE ORAL at 09:02

## 2023-09-14 RX ADMIN — VANCOMYCIN HYDROCHLORIDE 1250 MG: 5 INJECTION, POWDER, LYOPHILIZED, FOR SOLUTION INTRAVENOUS at 10:05

## 2023-09-14 RX ADMIN — SODIUM CHLORIDE, SODIUM GLUCONATE, SODIUM ACETATE, POTASSIUM CHLORIDE, MAGNESIUM CHLORIDE, SODIUM PHOSPHATE, DIBASIC, AND POTASSIUM PHOSPHATE 150 ML/HR: .53; .5; .37; .037; .03; .012; .00082 INJECTION, SOLUTION INTRAVENOUS at 08:15

## 2023-09-14 RX ADMIN — INSULIN GLARGINE 28 UNITS: 100 INJECTION, SOLUTION SUBCUTANEOUS at 06:40

## 2023-09-14 NOTE — ASSESSMENT & PLAN NOTE
· Home regimen: Flecainide and atenolol  · Maintained on aspirin  · Not on any anticoagulation per patient    Plan   · continue flecainide  · Hold atenolol in setting of sepsis and hypotension  · Continue cardiopulmonary monitoring

## 2023-09-14 NOTE — ED NOTES
This RN just informed at 14:45 that patient is to be picked up at 15:15 to be transported to [facility]. Patient updated at this time.       Bao Fraire RN  09/14/23 3557

## 2023-09-14 NOTE — ASSESSMENT & PLAN NOTE
· POA, unknown source  · Criteria on admission: Febrile (102.9), tachypnea(20) tachycardia(100)   · IV fluid resuscitated with a liter of NSS  · Continue maintenance fluids  · Given Vanco and cefepime in the ED  · Lactic acid 2.6  · Will not trend this as it will likely be elevated in the setting of his cancer    Plan:   · Continue broad-spectrum antibiotics  · Blood cultures x2 pending  · Transfer to [facility] for continuity of care with oncology  · Trend fever and WBC curve  · Procal daily  · Discontinue maintenance fluids  · Monitor I/O

## 2023-09-14 NOTE — ASSESSMENT & PLAN NOTE
· Maintained on lisinopril    Plan:   · Hold antihypertensives in the setting of sepsis and hypotension  · Monitor blood pressures

## 2023-09-14 NOTE — RESULT ENCOUNTER NOTE
Patient transferred to [facility].   Attempted to contact providers at [facility].  Transfer center at [facility] stated that they would "try to put get in touch with them and call back."  I did ask for the number for the floor and was advised that that was not necessary

## 2023-09-14 NOTE — EMTALA/ACUTE CARE TRANSFER
[address] [address]  Dept: [phone]      EMTALA TRANSFER CONSENT    NAME Alexander Harman                                         1955                              MRN [id number]    I have been informed of my rights regarding examination, treatment, and transfer   by Dr. Priya Clark DO    Benefits: Specialized equipment and/or services available at the receiving facility (Include comment)________________________ ([facility] onc service)    Risks: Potential for delay in receiving treatment, Potential deterioration of medical condition, Loss of IV, Increased discomfort during transfer, Possible worsening of condition or death during transfer      Consent for Transfer:  I acknowledge that my medical condition has been evaluated and explained to me by the emergency department physician or other qualified medical person and/or my attending physician, who has recommended that I be transferred to the service of  Accepting Physician: Dr. Betzaida Wilson at [address] [place] [address] [address] Name, [address] : [facility]. The above potential benefits of such transfer, the potential risks associated with such transfer, and the probable risks of not being transferred have been explained to me, and I fully understand them. The doctor has explained that, in my case, the benefits of transfer outweigh the risks. I agree to be transferred. I authorize the performance of emergency medical procedures and treatments upon me in both transit and upon arrival at the receiving facility. Additionally, I authorize the release of any and all medical records to the receiving facility and request they be transported with me, if possible. I understand that the safest mode of transportation during a medical emergency is an ambulance and that the Hospital advocates the use of this mode of transport.  Risks of traveling to the receiving facility by car, including absence of medical control, life sustaining equipment, such as oxygen, and medical personnel has been explained to me and I fully understand them. (DENISE CORRECT BOX BELOW)  [  ]  I consent to the stated transfer and to be transported by ambulance/helicopter. [  ]  I consent to the stated transfer, but refuse transportation by ambulance and accept full responsibility for my transportation by car. I understand the risks of non-ambulance transfers and I exonerate the Hospital and its staff from any deterioration in my condition that results from this refusal.    X___________________________________________    DATE  23  TIME________  Signature of patient or legally responsible individual signing on patient behalf           RELATIONSHIP TO PATIENT_________________________          Provider Certification    NAME Azael Curry                                        [facility] 1955                              MRN [id number]    A medical screening exam was performed on the above named patient. Based on the examination:    Condition Necessitating Transfer The encounter diagnosis was Neutropenic fever ([address]).     Patient Condition: The patient has been stabilized such that within reasonable medical probability, no material deterioration of the patient condition or the condition of the unborn child(alicia) is likely to result from the transfer    Reason for Transfer:      Transfer Requirements: Facility [facility]   · Space available and qualified personnel available for treatment as acknowledged by    · Agreed to accept transfer and to provide appropriate medical treatment as acknowledged by       Dr. Kyrie Dunlap  · Appropriate medical records of the examination and treatment of the patient are provided at the time of transfer   [address] _______  · Transfer will be performed by qualified personnel from    and appropriate transfer equipment as required, including the use of necessary and appropriate life support measures. Provider Certification: I have examined the patient and explained the following risks and benefits of being transferred/refusing transfer to the patient/family:  General risk, such as traffic hazards, adverse weather conditions, rough terrain or turbulence, possible failure of equipment (including vehicle or aircraft), or consequences of actions of persons outside the control of the transport personnel, Unanticipated needs of medical equipment and personnel during transport, Risk of worsening condition, The possibility of a transport vehicle being unavailable      Based on these reasonable risks and benefits to the patient and/or the unborn child(alicia), and based upon the information available at the time of the patient’s examination, I certify that the medical benefits reasonably to be expected from the provision of appropriate medical treatments at another medical facility outweigh the increasing risks, if any, to the individual’s medical condition, and in the case of labor to the unborn child, from effecting the transfer.     X____________________________________________ DATE 09/13/23        TIME_______      ORIGINAL - SEND TO MEDICAL RECORDS   COPY - SEND WITH PATIENT DURING TRANSFER

## 2023-09-14 NOTE — ED NOTES
SBAR report called to NEFTALI Spear at [facility].       Leonela Pierre RN  09/14/23 0006

## 2023-09-14 NOTE — ED CARE HANDOFF
Emergency Department Sign Out Note        Sign out and transfer of care from [facility], [facility]. See Separate Emergency Department note. The patient, Carli Lockwood, was evaluated by the previous provider for admission and transfer to [facility]. Workup Completed:  Pending transfer    ED Course / Workup Pending (followup):   Monitor until bed is available                                     Procedures  MDM        Disposition  Final diagnoses:   Neutropenic fever ([address])     Time reflects when diagnosis was documented in both MDM as applicable and the Disposition within this note     Time User Action Codes Description Comment    9/13/2023  7:58 PM Chandrika Chapa Add [D70.9,  R50.81] Neutropenic fever [facility])       ED Disposition     ED Disposition   Transfer to Another [address] [place] [address] of [facility]    Condition   --    Date/Time   Wed Sep 13, 2023  8:02 PM    Comment   Carli Lockwood should be transferred out to [facility].           MD Documentation    [facility] Most Recent Value   Patient Condition The patient has been stabilized such that within reasonable medical probability, no material deterioration of the patient condition or the condition of the unborn child(alicia) is likely to result from the transfer   Benefits of Transfer Specialized equipment and/or services available at the receiving facility (Include comment)________________________  [facility]]   Risks of Transfer Potential for delay in receiving treatment, Potential deterioration of medical condition, Loss of IV, Increased discomfort during transfer, Possible worsening of condition or death during transfer   Accepting Physician Dr. Rosina Acosta [facility]   Sending MD [facility]   Provider Certification General risk, such as traffic hazards, adverse weather conditions, rough terrain or turbulence, possible failure of equipment (including vehicle or aircraft), or consequences of actions of persons outside the control of the transport personnel, Unanticipated needs of medical equipment and personnel during transport, Risk of worsening condition, The possibility of a transport vehicle being unavailable      RN Documentation    [address] Name, [address] [facility]  [facility]      Follow-up Information    None       Patient's Medications   Discharge Prescriptions    No medications on file     No discharge procedures on file.        ED Provider  Electronically Signed by     Avelina Barone DO  09/14/23 9838

## 2023-09-14 NOTE — PROGRESS NOTES
Carli Lockwood is a 74 y.o. male who is currently ordered Vancomycin IV with management by the Pharmacy Consult service. Relevant clinical data and objective / subjective history reviewed. Vancomycin Assessment:  Indication and Goal AUC/Trough: Bacteremia (goal -600, trough >10)  Clinical Status: new start  Micro:     Renal Function:  SCr: 0.86 mg/dL (most recent lab from 23)  CrCl: 116.2 mL/min  Renal replacement: Not on dialysis  Days of Therapy: 1  Current Dose: 2000 mg IV once  Vancomycin Plan:  New Dosin mg IV q 12 hours  Estimated AUC: 474 mcg*hr/mL  Estimated Trough: 15.3 mcg/mL  Next Level: 9/15/23 at 0600  Renal Function Monitoring: Daily BMP and [place] will continue to follow closely for s/sx of nephrotoxicity, infusion reactions and appropriateness of therapy. BMP and CBC will be ordered per protocol. We will continue to follow the patient’s culture results and clinical progress daily.     Leroy King, Pharmacist

## 2023-09-14 NOTE — ASSESSMENT & PLAN NOTE
· Temp on arrival: 102.9   · Significant other reported temp at home 103.4   · Patient was recently admitted to the hospital for Pseudomonas bacteremia charged on 8/26  · Since that discharge patient has received 1 more round of chemo on 9/5 which is port was accessed for  · At this time the port is currently not accessed  · Denies sick contacts or URI symptoms  · Denies nausea/vomiting/diarrhea  · Denies recent dental procedures and oral pain  · WBC: 0.14  · LA: 2.6 > 2.5   · Procal: 0.53 - 6.14  · UA: Negative for leukocytes and nitrates, trace ketones, occasional bacteria, 0-1 WBC  · Unknown source of infection  · CXR: No evidence of consolidation or infiltrate  · Could be secondary to chemotherapy treatment however, higher than the threshold that I would consider appropriate for such.     Plan:   · Neutropenic precautions  · Blood cultures x2  · Broaden antibiotics: Flagyl, cefepime, Vanco  · IVF Resuscitation: 1L   · Placed on maintenance IV fluids  · We will D/C these fluids given delusional anemia   · Continue antibiotics  · Trend procal  · Trend WBC and fever curve  · Plan to transfer to [facility] for continuity of care

## 2023-09-14 NOTE — ASSESSMENT & PLAN NOTE
Lab Results   Component Value Date    HGBA1C 6.9 (H) 05/25/2023       Recent Labs     09/14/23  0623   POCGLU 187*       Blood Sugar Average: Last 72 hrs:  (P) 187     · CHO Diet   · Accu checks AC/HS   · SSI   · Lantus if necessary

## 2023-09-14 NOTE — ASSESSMENT & PLAN NOTE
· Lesions under b/l breast that began as blisters   · Per ID at [facility] - felt it to be shingles. · Increased acyclovir dose 800mg   · Continue Acyclovir. · Lesions appear improved and dry/closed.

## 2023-09-14 NOTE — H&P
[address] [facility]  H&P  Name: Lamar Gamboa 74 y.o. male I MRN: [id number]  Unit/Bed#: ED 02 I Date of Admission: 9/13/2023   Date of Service: 9/14/2023 I Hospital Day: 0      Assessment/Plan   Neutropenic fever ([address] [facility])  Assessment & Plan  · Temp on arrival: 102.9   · Significant other reported temp at home 103.4   · Patient was recently admitted to the hospital for Pseudomonas bacteremia charged on 8/26  · Since that discharge patient has received 1 more round of chemo on 9/5 which is port was accessed for  · At this time the port is currently not accessed  · Denies sick contacts or URI symptoms  · Denies nausea/vomiting/diarrhea  · Denies recent dental procedures and oral pain  · WBC: 0.14  · LA: 2.6 > 2.5   · Procal: 0.53 - 6.14  · UA: Negative for leukocytes and nitrates, trace ketones, occasional bacteria, 0-1 WBC  · Unknown source of infection  · CXR: No evidence of consolidation or infiltrate  · Could be secondary to chemotherapy treatment however, higher than the threshold that I would consider appropriate for such.     Plan:   · Neutropenic precautions  · Blood cultures x2  · Broaden antibiotics: Flagyl, cefepime, Vanco  · IVF Resuscitation: 1L   · Placed on maintenance IV fluids  · We will D/C these fluids given delusional anemia   · Continue antibiotics  · Trend procal  · Trend WBC and fever curve  · Plan to transfer to [facility] for continuity of care    Sepsis [facility])  Assessment & Plan  · POA, unknown source  · Criteria on admission: Febrile (102.9), tachypnea(20) tachycardia(100)   · IV fluid resuscitated with a liter of NSS  · Continue maintenance fluids  · Given Vanco and cefepime in the ED  · Lactic acid 2.6  · Will not trend this as it will likely be elevated in the setting of his cancer    Plan:   · Continue broad-spectrum antibiotics  · Blood cultures x2 pending  · Transfer to [facility] for continuity of care with oncology  · Trend fever and WBC curve  · Procal daily  · Discontinue maintenance fluids  · Monitor I/O    Diffuse large B cell lymphoma (HCC)  Assessment & Plan  · Undergoing active Chemo  · Last treatment 9/5  · Following treatment he completed 5 days of prednisone 100 mg daily  · Follows with [facility] - Oncology - Plan for admission to [facility] for continuity of care. DM II (diabetes mellitus, type II), controlled ([address])  Assessment & Plan  Lab Results   Component Value Date    HGBA1C 6.9 (H) 05/25/2023       Recent Labs     09/14/23  0623   POCGLU 187*       Blood Sugar Average: Last 72 hrs:  (P) 187     · CHO Diet   · Accu checks AC/HS   · SSI   · Lantus if necessary     Polyneuropathy  Assessment & Plan  · Not currently maintained on any pharmacological intervention    Herpes zoster  Assessment & Plan  · Lesions under b/l breast that began as blisters   · Per ID at [facility] - felt it to be shingles. · Increased acyclovir dose 800mg   · Continue Acyclovir. · Lesions appear improved and dry/closed. Waldenstrom macroglobulinemia ([address])  Assessment & Plan  · Original cancer diagnosis  · Now diagnosed with diffuse type B lymphoma    Hypertension  Assessment & Plan  · Maintained on lisinopril    Plan:   · Hold antihypertensives in the setting of sepsis and hypotension  · Monitor blood pressures    Paroxysmal atrial fibrillation (HCC)  Assessment & Plan  · Home regimen: Flecainide and atenolol  · Maintained on aspirin  · Not on any anticoagulation per patient    Plan   · continue flecainide  · Hold atenolol in setting of sepsis and hypotension  · Continue cardiopulmonary monitoring           History of Present Illness     HPI: Luis Chandler is a 74 y.o. who presents with fever of 103.4 reported at home from significant other.   Patient's past medical history of type 2 diabetes, diffuse B-cell lymphoma (going active chemo), history of Waldnestrom  Macroglobinemia, paroxysmal A-fib not on anticoagulation, hypertension and recent admission for sepsis secondary to Pseudomonas bacteremia (discharged on 8/26). Patient denies upper respiratory symptoms or sick contacts. Denies nausea vomiting diarrhea. No recent dental procedures or oral pain. Denies abdominal pain. No clear infectious source. Septic criteria on admission to ED with decreased WBC count, febrile, tachypneic,and tachycardia. Fluid resuscitated with 1 L of IV fluids and given Vanco and cefepime in ED. patient is well-known to [facility] for his oncology treatments and the ask was to send him there for continuity of care. At initial conversation with the ER [facility] did not have any bed availability. Patient admitted to ICU stepdown level 1 for neutropenic fever. Antibiotics were broadened at that time. Blood cultures x2 pending. Prior to patient reaching the ICU [facility] called with a bed for the patient to transfer to. Patient was transferred to [facility]. Care team completed transfer and discharge note as well. Patient never actually came to the ICU. History obtained from chart review and the patient. Review of Systems   Constitutional:        Patient has no endorsing complaints. All other systems reviewed and are negative.     Historical Information   Past Medical History:  No date: Arthritis  No date: Atrial fibrillation (HCC)  No date: Diabetes mellitus ([address] )  No date: Hypertension  No date: Lymphoma ([address] [facility])  No date: Waldenstrom's disease [facility]) Past Surgical History:  11/17/2022: US GUIDED THYROID BIOPSY   Current Outpatient Medications   Medication Instructions   • aspirin (ECOTRIN LOW STRENGTH) 81 mg, Oral, Daily   • atenolol (TENORMIN) 25 mg tablet 1 tablet, 2 times daily   • flecainide (TAMBOCOR) 150 MG tablet 1 tablet, 2 times daily   • lisinopril (ZESTRIL) 2.5 mg, Oral, Daily   • pantoprazole (PROTONIX) 40 mg tablet 1 tablet, Oral, Daily   • simvastatin (ZOCOR) 20 mg, Oral    No Known Allergies   Social History     Tobacco Use   • Smoking status: Never   • Smokeless tobacco: Never   Vaping Use   • Vaping Use: Never used   Substance Use Topics   • Alcohol use: Yes     Comment: socially   • Drug use: Never    History reviewed. No pertinent family history. Objective                            Vitals I/O      Most Recent Min/Max in 24hrs   Temp 98.3 °F (36.8 °C) Temp  Min: 97.7 °F (36.5 °C)  Max: 102.9 °F (39.4 °C)   Pulse 86 Pulse  Min: 75  Max: 107   Resp 18 Resp  Min: 12  Max: 22   /57 BP  Min: 85/55  Max: 162/74   O2 Sat 97 % SpO2  Min: 92 %  Max: 98 %      Intake/Output Summary (Last 24 hours) at 2023 1548  Last data filed at 2023 1356  Gross per 24 hour   Intake 4550 ml   Output 100 ml   Net 4450 ml         Diet Cardiovascular; Cardiac     Invasive Monitoring Physical exam   None Physical Exam  Eyes:      Pupils: Pupils are equal, round, and reactive to light. Skin:     General: Skin is warm. Capillary Refill: Capillary refill takes less than 2 seconds. Coloration: Skin is pale. HENT:      Head: Normocephalic. Mouth/Throat:      Mouth: Mucous membranes are moist.   Cardiovascular:      Rate and Rhythm: Normal rate. Rhythm irregular. Pulses: Normal pulses. Musculoskeletal:      Right lower le+ Edema present. Left lower le+ Edema present. Abdominal:      Palpations: Abdomen is soft. Constitutional:       Appearance: He is well-nourished. Pulmonary:      Effort: Pulmonary effort is normal.      Breath sounds: Normal breath sounds. Neurological:      General: No focal deficit present. Mental Status: He is alert and oriented to person, place and time. Motor: Strength full and intact in all extremities. Diagnostic Studies      EKG: A-fib.   Rate 80s  Imaging:  I have personally reviewed pertinent films in PACS     Medications:  Scheduled PRN   acyclovir, 800 mg, Daily  cefepime, 2,000 mg, Q8H  chlorhexidine, 15 mL, Q12H BETTY  furosemide, 20 mg, Once  heparin (porcine), 5,000 Units, Q8H [facility]  magnesium sulfate, 2 g, Once  metroNIDAZOLE, 500 mg, Q8H  vancomycin, 1,250 mg, Q12H          Continuous          Labs:    CBC    Recent Labs     09/13/23 1908 09/14/23  1314   WBC 0.14* 0.31*   HGB 7.9* 6.0*   HCT 24.0* 18.8*   PLT 24* 25*     BMP    Recent Labs     09/13/23 1908 09/14/23  1314   SODIUM 134* 131*   K 4.0 4.0    102   CO2 22 23   AGAP 9 6   BUN 16 17   CREATININE 0.86 0.80   CALCIUM 8.5 8.0*       Coags    Recent Labs     09/13/23  1908   INR 1.04   PTT 24        Additional Electrolytes  Recent Labs     09/14/23  1314   MG 1.6*   PHOS 3.2          Blood Gas    No recent results  No recent results LFTs  Recent Labs     09/13/23 1908 09/14/23  1314   ALT 22 14   AST 10* 7*   ALKPHOS 155* 106*   ALB 3.5 2.9*   TBILI 0.54 0.41       Infectious  Recent Labs     09/13/23 1908 09/14/23  1314   PROCALCITONI 0.53* 6.14*     Glucose  Recent Labs     09/13/23  1908 09/14/23  1314   GLUC 201* 213*             Critical Care Time Delivered: Upon my evaluation, this patient had a high probability of imminent or life-threatening deterioration due to Neutropenic fever, sepsis, and diffuse B-cell lymphoma, which required my direct attention, intervention, and personal management. I have personally provided 15 minutes of critical care time, exclusive of procedures, teaching, family meetings, and any prior time recorded by providers other than myself.    Anticipated Length of Stay is > 2 midnights  [facility]

## 2023-09-14 NOTE — ED NOTES
Patient informed this RN that hemoglobin is always on the lower side. Hemoglobin w/ lab work last PM was 7.9 which patient said is his baseline. Patient had been receiving maintenance fluids at 150 mL/hr per provider orders while awaiting bed at [facility] since last PM. Repeat CBC this afternoon showed a hemoglobin of 6.0. Provider, Lisette Dong, aware and acknowledged critical lab value via [place] Text from . This RN informed provider that patient is asymptomatic and has gotten fluids since last PM. Provider agrees hemoglobin is probably low d/t being diluted. Fluids stopped per order and repeat CBC to be obtained later today per provider.       Chava Moyer RN  09/14/23 6345

## 2023-09-14 NOTE — ED CARE HANDOFF
Emergency Department Sign Out Note        Sign out and transfer of care from Dr. Lobo Kumar. See Separate Emergency Department note. The patient, Bob Cuevas, was evaluated by the previous provider for fever. Workup Completed:  Labs Reviewed   CBC AND DIFFERENTIAL - Abnormal       Result Value Ref Range Status    WBC 0.14 (*) 4.31 - 10.16 Thousand/uL Final    RBC 2.53 (*) 3.88 - 5.62 Million/uL Final    Hemoglobin 7.9 (*) 12.0 - 17.0 g/dL Final    Hematocrit 24.0 (*) 36.5 - 49.3 % Final    MCV 95  82 - 98 fL Final    MCH 31.2  26.8 - 34.3 pg Final    MCHC 32.9  31.4 - 37.4 g/dL Final    RDW 15.8 (*) 11.6 - 15.1 % Final    MPV 12.5  8.9 - 12.7 fL Final    Platelets 24 (*) 551 - 390 Thousands/uL Final   COMPREHENSIVE METABOLIC PANEL - Abnormal    Sodium 134 (*) 135 - 147 mmol/L Final    Potassium 4.0  3.5 - 5.3 mmol/L Final    Chloride 103  96 - 108 mmol/L Final    CO2 22  21 - 32 mmol/L Final    ANION GAP 9  mmol/L Final    BUN 16  5 - 25 mg/dL Final    Creatinine 0.86  0.60 - 1.30 mg/dL Final    Comment: Standardized to IDMS reference method    Glucose 201 (*) 65 - 140 mg/dL Final    Comment: If the patient is fasting, the ADA then defines impaired fasting glucose as > 100 mg/dL and diabetes as > or equal to 123 mg/dL. Calcium 8.5  8.4 - 10.2 mg/dL Final    AST 10 (*) 13 - 39 U/L Final    ALT 22  7 - 52 U/L Final    Comment: Specimen collection should occur prior to Sulfasalazine administration due to the potential for falsely depressed results. Alkaline Phosphatase 155 (*) 34 - 104 U/L Final    Total Protein 5.9 (*) 6.4 - 8.4 g/dL Final    Albumin 3.5  3.5 - 5.0 g/dL Final    Total Bilirubin 0.54  0.20 - 1.00 mg/dL Final    Comment: Use of this assay is not recommended for patients undergoing treatment with eltrombopag due to the potential for falsely elevated results.   N-acetyl-p-benzoquinone imine (metabolite of Acetaminophen) will generate erroneously low results in samples for patients that have taken an overdose of Acetaminophen. eGFR 90  ml/min/1.73sq m Final    Narrative:     [facility] guidelines for Chronic Kidney Disease (CKD):   •  Stage 1 with normal or high GFR (GFR > 90 mL/min/1.73 square meters)  •  Stage 2 Mild CKD (GFR = 60-89 mL/min/1.73 square meters)  •  Stage 3A Moderate CKD (GFR = 45-59 mL/min/1.73 square meters)  •  Stage 3B Moderate CKD (GFR = 30-44 mL/min/1.73 square meters)  •  Stage 4 Severe CKD (GFR = 15-29 mL/min/1.73 square meters)  •  Stage 5 End Stage CKD (GFR <15 mL/min/1.73 square meters)  Note: GFR calculation is accurate only with a steady state creatinine   LACTIC ACID, PLASMA (W/REFLEX IF RESULT > 2.0) - Abnormal    LACTIC ACID 2.6 (*) 0.5 - 2.0 mmol/L Final    Narrative:     Result may be elevated if tourniquet was used during collection. PROCALCITONIN TEST - Abnormal    Procalcitonin 0.53 (*) <=0.25 ng/ml Final    Comment: Suspected Lower Respiratory Tract Infection (LRTI):  - LESS than or EQUAL to 0.25 ng/mL:   low likelihood for bacterial LRTI; antibiotics DISCOURAGED.  - GREATER than 0.25 ng/mL:   increased likelihood for bacterial LRTI; antibiotics ENCOURAGED. Suspected Sepsis:  - Strongly consider initiating antibiotics in ALL UNSTABLE patients. - LESS than or EQUAL to 0.5 ng/mL:   low likelihood for bacterial sepsis; antibiotics DISCOURAGED.  - GREATER than 0.5 ng/mL:   increased likelihood for bacterial sepsis; antibiotics ENCOURAGED.  - GREATER than 2 ng/mL:   high risk for severe sepsis / septic shock; antibiotics strongly ENCOURAGED. Decisions on antibiotic use should not be based solely on Procalcitonin (PCT) levels. If PCT is low but uncertainty exists with stopping antibiotics, repeat PCT in 6-24 hours to confirm the low level.  If antibiotics are administered (regardless if initial PCT was high or low), repeat PCT every 1-2 days to consider early antibiotic cessation (when GREATER than 80% decrease from the peak OR when PCT drops below designated cutoffs, whichever comes first), so long as the infection is NOT one that typically requires prolonged treatment durations (e.g., bone/joint infections, endocarditis, Staph. aureus bacteremia).     Situations of FALSE-POSITIVE Procalcitonin values:  1) Newborns < 67 hours old  2) Massive stress from severe trauma / burns, major surgery, acute pancreatitis, cardiogenic / hemorrhagic shock, sickle cell crisis, or other organ perfusion abnormalities  3) Malaria and some Candidal infections  4) Treatment with agents that stimulate cytokines (e.g., OKT3, anti-lymphocyte globulins, alemtuzumab, IL-2, granulocyte transfusion [NOT GCSFs])  5) Chronic renal disease causes elevated baseline levels (consider GREATER than 0.75 ng/mL as an abnormal cut-off); initiating HD/CRRT may cause transient decreases  6) Paraneoplastic syndromes from medullary thyroid or SCLC, some forms of vasculitis, and acute agsma-hz-bvrl disease    Situations of FALSE-NEGATIVE Procalcitonin values:  1) Too early in clinical course for PCT to have reached its peak (may repeat in 6-24 hours to confirm low level)  2) Localized infection WITHOUT systemic (SIRS / sepsis) response (e.g., an abscess, osteomyelitis, cystitis)  3) Mycobacteria (e.g., Tuberculosis, MAC)  4) Cystic fibrosis exacerbations     UA W REFLEX TO MICROSCOPIC WITH REFLEX TO CULTURE - Abnormal    Color, UA Yellow   Final    Clarity, UA Slightly Cloudy   Final    Specific Gravity, UA 1.015  1.003 - 1.030 Final    pH, UA 5.5  4.5, 5.0, 5.5, 6.0, 6.5, 7.0, 7.5, 8.0 Final    Leukocytes, UA Negative  Negative Final    Nitrite, UA Negative  Negative Final    Protein, UA Trace (*) Negative mg/dl Final    Glucose,  (1/4%) (*) Negative mg/dl Final    Ketones, UA Trace (*) Negative mg/dl Final    Urobilinogen, UA 1.0  0.2, 1.0 E.U./dl E.U./dl Final    Bilirubin, UA Negative  Negative Final    Occult Blood, UA Negative  Negative Final   COVID19, INFLUENZA A/B, RSV PCR, [facility] - Normal    SARS-CoV-2 Negative  Negative Final    INFLUENZA A PCR Negative  Negative Final    INFLUENZA B PCR Negative  Negative Final    RSV PCR Negative  Negative Final    Narrative:     FOR PEDIATRIC PATIENTS - copy/paste COVID Guidelines URL to browser: [email or web address]    SARS-CoV-2 assay is a Nucleic Acid Amplification assay intended for the  qualitative detection of nucleic acid from SARS-CoV-2 in nasopharyngeal  swabs. Results are for the presumptive identification of SARS-CoV-2 RNA. Positive results are indicative of infection with SARS-CoV-2, the virus  causing COVID-19, but do not rule out bacterial infection or co-infection  with other viruses. Laboratories within the [facility] and its  territories are required to report all positive results to the appropriate  public health authorities. Negative results do not preclude SARS-CoV-2  infection and should not be used as the sole basis for treatment or other  patient management decisions. Negative results must be combined with  clinical observations, patient history, and epidemiological information. This test has not been FDA cleared or approved. This test has been authorized by FDA under an Emergency Use Authorization  (EUA). This test is only authorized for the duration of time the  declaration that circumstances exist justifying the authorization of the  emergency use of an in vitro diagnostic tests for detection of SARS-CoV-2  virus and/or diagnosis of COVID-19 infection under section 564(b)(1) of  the Act, 21 U. S.C. 495OGZ-5(M)(3), unless the authorization is terminated  or revoked sooner. The test has been validated but independent review by FDA  and CLIA is pending. Test performed using [employer]: This RT-PCR assay targets N2,  a region unique to SARS-CoV-2.  A conserved region in the E-gene was chosen  for pan-Sarbecovirus detection which includes SARS-CoV-2. According to CMS-2020-01-R, this platform meets the definition of high-throughput technology. APTT - Normal    PTT 24  23 - 37 seconds Final    Comment: Therapeutic Heparin Range =  60-90 seconds   PROTIME-INR - Normal    Protime 14.0  11.6 - 14.5 seconds Final    INR 1.04  0.84 - 1.19 Final   URINE MICROSCOPIC - Normal    RBC, UA 0-1  None Seen, 0-1, 1-2, 2-4, 0-5 /hpf Final    WBC, UA 0-1  None Seen, 0-1, 1-2, 0-5, 2-4 /hpf Final    Epithelial Cells Occasional  None Seen, Occasional /hpf Final    Bacteria, UA Occasional  None Seen, Occasional /hpf Final   BLOOD CULTURE   BLOOD CULTURE   LACTIC ACID 2 HOUR   B-TYPE NATRIURETIC PEPTIDE (BNP)   MANUAL DIFFERENTIAL(PHLEBS DO NOT ORDER)         ED Course / Workup Pending (followup):  XR chest portable    Result Date: 8/23/2023  Narrative: History: Sepsis. Portable AP erect examination of the chest was obtained. There is segmental atelectasis in the left base. The lung fields are otherwise clear. The heart is not enlarged. There is no change from 8/1/2023. Impression: IMPRESSION: No acute cardiopulmonary pathology. [id number]                                    ED Course as of 09/13/23 2151   Wed Sep 13, 2023   2059 Care assumed pending d/w hospitalist JELLY HARRIS for acceptance for transfer. 2144 Spoke with Dr. Marcela HUNTLEYIST KAREN HARRIS hospitalist on call reviewed case and findings in ED accepts for transfer. Procedures  Medical Decision Making  Neutropenic fever ([address]): acute illness or injury  Amount and/or Complexity of Data Reviewed  Labs: ordered. Decision-making details documented in ED Course. Radiology: ordered and independent interpretation performed. Decision-making details documented in ED Course. ECG/medicine tests: ordered and independent interpretation performed. Decision-making details documented in ED Course. Risk  OTC drugs. Prescription drug management.               Disposition  Final diagnoses:   Neutropenic fever [facility])     Time reflects when diagnosis was documented in both MDM as applicable and the Disposition within this note     Time User Action Codes Description Comment    9/13/2023  7:58 PM Cris Mann Add [D70.9,  R50.81] Neutropenic fever [facility])       ED Disposition     ED Disposition   Transfer to Another [address] [place] [address] Ave of Network    Condition   --    Date/Time   Wed Sep 13, 2023  8:02 PM    Comment   Jorge Latasha should be transferred out to [facility].           Follow-up Information    None       Patient's Medications    No medications on file     No discharge procedures on file.        ED Provider  Electronically Signed by     Merlin Pagoda, DO  09/13/23 6789

## 2023-09-14 NOTE — ED NOTES
Provider aware that patient's BP is low in 80s. MAP still over 65. Patient reports this is his norm post chemo. Patient denies any symptoms at this time.      Ry Baron RN  09/14/23 2493

## 2023-09-14 NOTE — ED NOTES
Patient picked up by [facility] at this time. Admitting provider, Jenn Zielger, filled out new EMTALA and medical necessity. All necessary paperwork given to EMS. Patient leaving w/ all belongings at this time. Report to be called shortly.       Tim Bradshaw RN  09/14/23 7860

## 2023-09-14 NOTE — DISCHARGE SUMMARY
Discharge Summary - Carli Lockwood 74 y.o. male MRN: [id number]    Unit/Bed#: ED 02 Encounter: [id number]    Admission Date:   Admission Orders (From admission, onward)     Ordered        09/14/23 1247  INPATIENT ADMISSION  Once                        Admitting Diagnosis: Fever [R50.9]    HPI: 74 y.o. who presents with fever of 103.4 reported at home from significant other. Patient's past medical history of type 2 diabetes, diffuse B-cell lymphoma (going active chemo), history of [place]  Macroglobinemia, paroxysmal A-fib not on anticoagulation, hypertension and recent admission for sepsis secondary to Pseudomonas bacteremia (discharged on 8/26). Patient denies upper respiratory symptoms or sick contacts. Denies nausea vomiting diarrhea. No recent dental procedures or oral pain. Denies abdominal pain. No clear infectious source. Septic criteria on admission to ED with decreased WBC count, febrile, tachypneic,and tachycardia. Fluid resuscitated with 1 L of IV fluids and given Vanco and cefepime in ED. patient is well-known to [facility] for his oncology treatments and the ask was to send him there for continuity of care. At initial conversation with the ER [facility] did not have any bed availability. Patient admitted to ICU stepdown level 1 for neutropenic fever. Antibiotics were broadened at that time. Blood cultures x2 pending. Prior to patient reaching the ICU [facility] called with a bed for the patient to transfer to. Patient was transferred to [facility]. Care team completed transfer and discharge note as well. Patient never actually came to the ICU. Procedures Performed:   Orders Placed This Encounter   Procedures   • ED ECG Documentation Only       Summary of Hospital Course: ED provider texted ICU team for admission secondary to not having a bed availability at [facility] [place].  Critical care accept patient as a stepdown level 1 for close monitoring in the setting of sepsis. Broadened antibiotics. Cultures x2 pending. Stopped IV fluids in the setting of repeat hemoglobin likely delusional.  Would recommend repeating hemoglobin. Otherwise repeat labwork for 9/14 was benign. Significant other updated. Before patient was able to be transferred to the ICU [facility] called for an available bed for patient. Patient was transferred from our ER to [facility].  This is where he receives his oncological care. Patient's oncologist Dr. Annabelle Fall office notified of patient's admission and transferred to [facility]. Significant Findings, Care, Treatment and Services Provided: Prior to patient leaving notified the patient had 1 out of 2 positive blood cultures for gram-negative rods. Patient updated. Complications: None    Discharge Diagnosis: Sepsis secondary to bacteremia    Medical Problems     Resolved Problems  Date Reviewed: 9/14/2023   None         Condition at Discharge: stable         Discharge instructions/Information to patient and family:   See after visit summary for information provided to patient and family. Provisions for Follow-Up Care:  See after visit summary for information related to follow-up care and any pertinent home health orders. PCP: Kasandra Richmond DO    Disposition: [facility] acute care hospital    Planned Readmission: Yes to [facility]      Discharge Statement   I spent 10 minutes discharging the patient. This time was spent on the day of discharge. I had direct contact with the patient on the day of discharge. Additional documentation is required if more than 30 minutes were spent on discharge. Discharge Medications:  See after visit summary for reconciled discharge medications provided to patient and family.

## 2023-09-14 NOTE — EMTALA/ACUTE CARE TRANSFER
[address] [address]  Dept: [phone]      EMTALA TRANSFER CONSENT    NAME Debi RODRIGUEZ 1955                              MRN [id number]    I have been informed of my rights regarding examination, treatment, and transfer   by Dr. Maile Habermann, MD    Benefits: Continuity of care    Risks: Potential for delay in receiving treatment, Potential deterioration of medical condition, Loss of IV, Possible worsening of condition or death during transfer, Increased discomfort during transfer      Transfer Request   I acknowledge that my medical condition has been evaluated and explained to me by the emergency department physician or other qualified medical person and/or my attending physician who has recommended and offered to me further medical examination and treatment. I understand the Hospital's obligation with respect to the treatment and stabilization of my emergency medical condition. I nevertheless request to be transferred. I release the Hospital, the doctor, and any other persons caring for me from all responsibility or liability for any injury or ill effects that may result from my transfer and agree to accept all responsibility for the consequences of my choice to transfer, rather than receive stabilizing treatment at the Hospital. I understand that because the transfer is my request, my insurance may not provide reimbursement for the services. The Hospital will assist and direct me and my family in how to make arrangements for transfer, but the hospital is not liable for any fees charged by the transport service.   In spite of this understanding, I refuse to consent to further medical examination and treatment which has been offered to me, and request transfer to [address] [facility] [address] Name, [place] & State : [address]. I authorize the performance of emergency medical procedures and treatments upon me in both transit and upon arrival at the receiving facility. Additionally, I authorize the release of any and all medical records to the receiving facility and request they be transported with me, if possible. I authorize the performance of emergency medical procedures and treatments upon me in both transit and upon arrival at the receiving facility. Additionally, I authorize the release of any and all medical records to the receiving facility and request they be transported with me, if possible. I understand that the safest mode of transportation during a medical emergency is an ambulance and that the Hospital advocates the use of this mode of transport. Risks of traveling to the receiving facility by car, including absence of medical control, life sustaining equipment, such as oxygen, and medical personnel has been explained to me and I fully understand them. (DENISE CORRECT BOX BELOW)  [  ]  I consent to the stated transfer and to be transported by ambulance/helicopter. [  ]  I consent to the stated transfer, but refuse transportation by ambulance and accept full responsibility for my transportation by car. I understand the risks of non-ambulance transfers and I exonerate the Hospital and its staff from any deterioration in my condition that results from this refusal.    X___________________________________________    DATE  23  TIME________  Signature of patient or legally responsible individual signing on patient behalf           RELATIONSHIP TO PATIENT_________________________          Provider Certification    NAME Sarthak Christianson                                        [facility] 1955                              MRN [id number]    A medical screening exam was performed on the above named patient. Based on the examination:    Condition Necessitating Transfer The encounter diagnosis was Neutropenic fever ([address]).     Patient Condition: The patient has been stabilized such that within reasonable medical probability, no material deterioration of the patient condition or the condition of the unborn child(alicia) is likely to result from the transfer    Reason for Transfer: Other (Include comment)____________________ (Continuity of care)    Transfer Requirements: [address]   · Space available and qualified personnel available for treatment as acknowledged by    · Agreed to accept transfer and to provide appropriate medical treatment as acknowledged by       [address]  · Appropriate medical records of the examination and treatment of the patient are provided at the time of transfer   [address] _______  · Transfer will be performed by qualified personnel from    and appropriate transfer equipment as required, including the use of necessary and appropriate life support measures. Provider Certification: I have examined the patient and explained the following risks and benefits of being transferred/refusing transfer to the patient/family:  General risk, such as traffic hazards, adverse weather conditions, rough terrain or turbulence, possible failure of equipment (including vehicle or aircraft), or consequences of actions of persons outside the control of the transport personnel, Unanticipated needs of medical equipment and personnel during transport, The possibility of a transport vehicle being unavailable, Risk of worsening condition      Based on these reasonable risks and benefits to the patient and/or the unborn child(alicia), and based upon the information available at the time of the patient’s examination, I certify that the medical benefits reasonably to be expected from the provision of appropriate medical treatments at another medical facility outweigh the increasing risks, if any, to the individual’s medical condition, and in the case of labor to the unborn child, from effecting the transfer.     X____________________________________________ DATE 09/14/23        TIME_______      ORIGINAL - SEND TO MEDICAL RECORDS   COPY - SEND WITH PATIENT DURING TRANSFER

## 2023-09-16 LAB
BACTERIA BLD CULT: ABNORMAL
BACTERIA BLD CULT: ABNORMAL
GRAM STN SPEC: ABNORMAL
GRAM STN SPEC: ABNORMAL
P AERUGINOSA DNA BLD POS NAA+NON-PROBE: DETECTED

## 2023-11-13 PROBLEM — D70.9 NEUTROPENIC FEVER: Status: RESOLVED | Noted: 2023-09-14 | Resolved: 2023-11-13

## 2023-11-13 PROBLEM — A41.9 SEPSIS (HCC): Status: RESOLVED | Noted: 2023-09-14 | Resolved: 2023-11-13

## 2023-11-13 PROBLEM — R50.81 NEUTROPENIC FEVER: Status: RESOLVED | Noted: 2023-09-14 | Resolved: 2023-11-13

## 2023-12-05 ENCOUNTER — APPOINTMENT (EMERGENCY)
Dept: CT IMAGING | Facility: HOSPITAL | Age: 68
End: 2023-12-05
Payer: MEDICARE

## 2023-12-05 ENCOUNTER — HOSPITAL ENCOUNTER (EMERGENCY)
Facility: HOSPITAL | Age: 68
Discharge: HOME | End: 2023-12-05
Attending: EMERGENCY MEDICINE
Payer: MEDICARE

## 2023-12-05 VITALS
HEIGHT: 71 IN | TEMPERATURE: 97.7 F | BODY MASS INDEX: 38.58 KG/M2 | RESPIRATION RATE: 18 BRPM | DIASTOLIC BLOOD PRESSURE: 53 MMHG | WEIGHT: 275.57 LBS | OXYGEN SATURATION: 97 % | SYSTOLIC BLOOD PRESSURE: 97 MMHG | HEART RATE: 101 BPM

## 2023-12-05 DIAGNOSIS — H05.231 PERIORBITAL HEMATOMA OF RIGHT EYE: ICD-10-CM
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
## 2023-12-05 DIAGNOSIS — S09.90XA INJURY OF HEAD, INITIAL ENCOUNTER: ICD-10-CM

## 2023-12-05 PROCEDURE — 90471 IMMUNIZATION ADMIN: CPT

## 2023-12-05 PROCEDURE — 72125 CT NECK SPINE W/O DYE: CPT

## 2023-12-05 PROCEDURE — 70450 CT HEAD/BRAIN W/O DYE: CPT

## 2023-12-05 PROCEDURE — 99284 EMERGENCY DEPT VISIT MOD MDM: CPT | Performed by: EMERGENCY MEDICINE

## 2023-12-05 PROCEDURE — 90715 TDAP VACCINE 7 YRS/> IM: CPT | Performed by: EMERGENCY MEDICINE

## 2023-12-05 PROCEDURE — 99284 EMERGENCY DEPT VISIT MOD MDM: CPT

## 2023-12-05 RX ORDER — ACETAMINOPHEN 325 MG/1
975 TABLET ORAL ONCE
Status: COMPLETED | OUTPATIENT
Start: 2023-12-05 | End: 2023-12-05

## 2023-12-05 RX ADMIN — ACETAMINOPHEN 975 MG: 325 TABLET, FILM COATED ORAL at 01:01

## 2023-12-05 RX ADMIN — TETANUS TOXOID, REDUCED DIPHTHERIA TOXOID AND ACELLULAR PERTUSSIS VACCINE, ADSORBED 0.5 ML: 5; 2.5; 8; 8; 2.5 SUSPENSION INTRAMUSCULAR at 01:02

## 2023-12-05 NOTE — ED PROVIDER NOTES
Emergency Department Trauma Note  Marcy Cannon 79 y.o. male MRN: [id number]  Unit/Bed#: ED 10/ED 10 Encounter: [id number]      Trauma Alert: Trauma Acuity: Trauma Evaluation  Model of Arrival: Mode of Arrival: BLS via Trauma Squad Name and Number: [id number]  Trauma Team: Current Providers  Attending Provider: Nikki Roberts DO  Consultants:     None      History of Present Illness     Chief Complaint:   Chief Complaint   Patient presents with    Head Injury     Pt fell while sitting on the toilet tonight and hit his head off of the floor. Laceration noted to right side of head and right periorbital ecchymosis and swelling noted. HPI:  Marcy Cannon is a 79 y.o. male who presents with fall. Mechanism:Details of Incident: pt fell off of toilet and landed on tile floor Injury Date: 12/05/23 Injury Time: 0000 Injury Occurence Location - [address] [place] [address] Street: [address] [place] [address]    Patient is a 57-year-old male history of lymphoma and Waldenström's diabetes hypertension and A-fib currently on aspirin daily no anticoagulants presents emergency department due to fall patient was getting up off the toilet and lost balance and fell forward striking head on the floor. Patient denies loss of consciousness sustained contusion and abrasion above the right eye no other injury complains of mild discomfort around the right eye and swelling. Tetanus is not up-to-date      History provided by:  Patient and EMS personnel    Review of Systems   Constitutional:  Negative for activity change, appetite change, chills, fatigue and fever. HENT:  Negative for congestion, ear pain, rhinorrhea and sore throat. Head injury   Eyes:  Negative for discharge, redness and visual disturbance. Respiratory:  Negative for cough, chest tightness, shortness of breath and wheezing. Cardiovascular:  Negative for chest pain and palpitations. Gastrointestinal:  Negative for abdominal pain, constipation, diarrhea, nausea and vomiting. Endocrine: Negative for polydipsia and polyuria. Genitourinary:  Negative for difficulty urinating, dysuria, frequency, hematuria and urgency. Musculoskeletal:  Negative for arthralgias and myalgias. Skin:  Negative for color change, pallor and rash. Neurological:  Negative for dizziness, weakness, light-headedness, numbness and headaches. Hematological:  Negative for adenopathy. Does not bruise/bleed easily. All other systems reviewed and are negative. Historical Information     Immunizations:   Immunization History   Administered Date(s) Administered    COVID-19 PFIZER VACCINE 0.3 ML IM 2020, 2021, 10/06/2021    COVID-19 Pfizer Vac BIVALENT Martin-sucrose 12 Yr+ IM 2022    Tdap 2023       Past Medical History:   Diagnosis Date    Arthritis     Atrial fibrillation ([facility])     Diabetes mellitus ([facility])     Hypertension     Lymphoma ([facility])     Waldenstrom's disease ([facility])      History reviewed. No pertinent family history. Past Surgical History:   Procedure Laterality Date    US GUIDED THYROID BIOPSY  2022     Social History     Tobacco Use    Smoking status: Never    Smokeless tobacco: Never   Vaping Use    Vaping Use: Never used   Substance Use Topics    Alcohol use: Yes     Comment: socially    Drug use: Never     E-Cigarette/Vaping    E-Cigarette Use Never User      E-Cigarette/Vaping Substances    Nicotine No     THC No     CBD No     Flavoring No     Other No     Unknown No        Family History: non-contributory    Meds/Allergies   Prior to Admission Medications   Prescriptions Last Dose Informant Patient Reported?  Taking?   aspirin (ECOTRIN LOW STRENGTH) 81 mg EC tablet   Yes No   Sig: Take 81 mg by mouth daily   atenolol (TENORMIN) 25 mg tablet   Yes No   Si tablet 2 (two) times a day   flecainide (TAMBOCOR) 150 MG tablet   Yes No   Si tablet 2 (two) times a day   lisinopril (ZESTRIL) 2.5 mg tablet   Yes No   Sig: Take 2.5 mg by mouth daily   pantoprazole (PROTONIX) 40 mg tablet   Yes No   Sig: Take 1 tablet by mouth daily   simvastatin (ZOCOR) 20 mg tablet   Yes No   Sig: Take 20 mg by mouth      Facility-Administered Medications: None       No Known Allergies    PHYSICAL EXAM    PE limited by: none    Objective   Vitals:   First set: Temperature: 97.7 °F (36.5 °C) (12/05/23 0032)  Pulse: 75 (12/05/23 0032)  Respirations: 18 (12/05/23 0032)  Blood Pressure: 103/57 (12/05/23 0032)  SpO2: 97 % (12/05/23 0032)    Primary Survey:   (A) Airway: intact  (B) Breathing: clear b/l bs  (C) Circulation: Pulses:   normal  (D) Disabliity:  GCS Total:  15  (E) Expose:  Completed    Secondary Survey: (Click on Physical Exam tab above)  Physical Exam  Vitals and nursing note reviewed. Constitutional:       Appearance: He is well-developed. HENT:      Head: Normocephalic. Abrasion and contusion present. Comments: Contusion and abrasion above the right eye periorbital ecchymosis right no evidence of extraocular movement palsy. Right Ear: External ear normal.      Left Ear: External ear normal.      Nose: Nose normal.   Eyes:      Conjunctiva/sclera: Conjunctivae normal.      Pupils: Pupils are equal, round, and reactive to light. Cardiovascular:      Rate and Rhythm: Normal rate and regular rhythm. Heart sounds: Normal heart sounds. Pulmonary:      Effort: Pulmonary effort is normal. No respiratory distress. Breath sounds: Normal breath sounds. No wheezing or rales. Chest:      Chest wall: No tenderness. Abdominal:      General: Bowel sounds are normal. There is no distension. Palpations: Abdomen is soft. Tenderness: There is no abdominal tenderness. There is no guarding. Musculoskeletal:         General: Normal range of motion. Cervical back: Normal range of motion and neck supple. Skin:     General: Skin is warm and dry. Neurological:      Mental Status: He is alert and oriented to person, place, and time.       Cranial Nerves: No cranial nerve deficit. Sensory: No sensory deficit. Cervical spine cleared by clinical criteria? No (imaging required)      Invasive Devices       Peripheral Intravenous Line  Duration             Peripheral IV 09/13/23 Left;Proximal;Ventral (anterior) Antecubital 82 days                    Lab Results:   Results Reviewed       None                   Imaging Studies:   Direct to CT: No  TRAUMA - CT spine cervical wo contrast   Final Result by Anel Lawrence MD (12/05 0119)   Addendum (preliminary) 1 of 1 by Anel Lawrence MD (12/05 0119)   ADDENDUM:      Bilateral thyroid nodules, largest of which measures 3.3 cm on the left. Nonemergent thyroid ultrasound is recommended. Final      No cervical spine fracture or traumatic malalignment. Scattered osseous lytic foci in the cervical spine. Correlate with history of diffuse large B-cell lymphoma and outside PET/CT on 7/19/2023. The study was marked in [facility] for immediate notification. Workstation performed: [id number]         TRAUMA - CT head wo contrast   Final Result by Anel Lawrence MD (12/05 0129)      No acute intracranial abnormality. Several indeterminate osseous lucencies in the calvarium. Correlate with history of diffuse large B-cell lymphoma and prior outside imaging. The study was marked in [facility] for immediate notification. Workstation performed: [id number]               Procedures  Procedures         ED Course     No injury to the chest abdomen pelvis no chest x-ray pelvic x-ray ordered or indicated. Cervical Collar Clearance: The patient had a CT scan of the cervical spine demonstrating no acute injury. On exam, the patient had no midline point tenderness or paresthesias/numbness/weakness in the extremities. The patient had full range of motion (was then able to flex, extend, and rotate head laterally) without pain.  There were no distracting injuries and the patient was not intoxicated. The patient's cervical spine was cleared radiologically and clinically. Cervical collar removed at this time. Sandra Almazan DO  12/5/2023 1:33 AM       Medical Decision Making  Differential diagnosis included but not limited to intracranial hemorrhage skull fracture cervical fracture. Patient remained clinically hemodynamically neurologically stable in the emergency department workup in the ED reveals no evidence of acute posttraumatic injury for now advised supportive care for contusion and hematoma and abrasion and follow-up with primary physician for reevaluation. return precautions and anticipatory guidance discussed. Problems Addressed:  Fall, initial encounter: acute illness or injury  Injury of head, initial encounter: acute illness or injury  Periorbital hematoma of right eye: acute illness or injury    Amount and/or Complexity of Data Reviewed  Radiology: ordered and independent interpretation performed. Decision-making details documented in ED Course. Risk  OTC drugs. Prescription drug management. Disposition  [facility] Transfer: No  Final diagnoses:   Fall, initial encounter   Injury of head, initial encounter   Periorbital hematoma of right eye     Time reflects when diagnosis was documented in both MDM as applicable and the Disposition within this note       Time User Action Codes Description Comment    12/5/2023  1:30 AM Kaye Oreilly Add [F04. WVDK] Fall, initial encounter     12/5/2023  1:30 AM Kaye Oreilly Add [U58.97GS] Injury of head, initial encounter     12/5/2023  1:31 AM Kaye Oreilly Add [W73.399] Periorbital hematoma of right eye           ED Disposition       ED Disposition   Discharge    Condition   Stable    Date/Time   Tue Dec 5, 2023  1:30 AM    Comment   Vandana Patino discharge to home/self care.                    Follow-up Information       Follow up With Specialties Details Why Contact Bridger Lynch DO  Schedule an appointment as soon as possible for a visit in 2 days  [place]  [address] [facility]  [phone]            Patient's Medications   Discharge Prescriptions    No medications on file     No discharge procedures on file.     PDMP Review       None            ED Provider  Electronically Signed by           Viola Archer DO  12/05/23 [id number]

## 2024-04-23 ENCOUNTER — DOCTOR'S OFFICE (OUTPATIENT)
Dept: URBAN - NONMETROPOLITAN AREA CLINIC 1 | Facility: CLINIC | Age: 69
Setting detail: OPHTHALMOLOGY
End: 2024-04-23
Payer: MEDICARE

## 2024-04-23 ENCOUNTER — RX ONLY (RX ONLY)
Age: 69
End: 2024-04-23

## 2024-04-23 DIAGNOSIS — H35.373: ICD-10-CM

## 2024-04-23 DIAGNOSIS — E11.9: ICD-10-CM

## 2024-04-23 DIAGNOSIS — E11.3293: ICD-10-CM

## 2024-04-23 DIAGNOSIS — H44.30: ICD-10-CM

## 2024-04-23 PROCEDURE — 99213 OFFICE O/P EST LOW 20 MIN: CPT | Performed by: OPHTHALMOLOGY

## 2024-04-23 PROCEDURE — 92134 CPTRZ OPH DX IMG PST SGM RTA: CPT | Performed by: OPHTHALMOLOGY

## 2024-04-30 ENCOUNTER — AMBUL SURGICAL CARE (OUTPATIENT)
Dept: URBAN - NONMETROPOLITAN AREA SURGERY 1 | Facility: SURGERY | Age: 69
Setting detail: OPHTHALMOLOGY
End: 2024-04-30
Payer: MEDICARE

## 2024-04-30 DIAGNOSIS — H26.492: ICD-10-CM

## 2024-04-30 PROBLEM — H26.493 POSTERIOR CAPSULE OPACIFIED; BOTH EYES: Status: ACTIVE | Noted: 2024-04-23

## 2024-04-30 PROCEDURE — G8918 PT W/O PREOP ORDER IV AB PRO: HCPCS | Mod: LT | Performed by: CLINIC/CENTER

## 2024-04-30 PROCEDURE — G8907 PT DOC NO EVENTS ON DISCHARG: HCPCS | Mod: LT | Performed by: CLINIC/CENTER

## 2024-04-30 PROCEDURE — 66821 AFTER CATARACT LASER SURGERY: CPT | Mod: LT | Performed by: CLINIC/CENTER

## 2024-04-30 PROCEDURE — 66821 AFTER CATARACT LASER SURGERY: CPT | Mod: LT | Performed by: OPHTHALMOLOGY

## 2024-04-30 PROCEDURE — G8907 PT DOC NO EVENTS ON DISCHARG: HCPCS | Performed by: OPHTHALMOLOGY

## 2024-04-30 PROCEDURE — G8918 PT W/O PREOP ORDER IV AB PRO: HCPCS | Performed by: OPHTHALMOLOGY

## 2024-05-14 ENCOUNTER — AMBUL SURGICAL CARE (OUTPATIENT)
Dept: URBAN - NONMETROPOLITAN AREA SURGERY 1 | Facility: SURGERY | Age: 69
Setting detail: OPHTHALMOLOGY
End: 2024-05-14
Payer: MEDICARE

## 2024-05-14 DIAGNOSIS — H26.491: ICD-10-CM

## 2024-05-14 PROCEDURE — G8907 PT DOC NO EVENTS ON DISCHARG: HCPCS | Performed by: OPHTHALMOLOGY

## 2024-05-14 PROCEDURE — G8907 PT DOC NO EVENTS ON DISCHARG: HCPCS | Performed by: CLINIC/CENTER

## 2024-05-14 PROCEDURE — 66821 AFTER CATARACT LASER SURGERY: CPT | Mod: 79,RT | Performed by: OPHTHALMOLOGY

## 2024-05-14 PROCEDURE — G8918 PT W/O PREOP ORDER IV AB PRO: HCPCS | Performed by: OPHTHALMOLOGY

## 2024-05-14 PROCEDURE — 66821 AFTER CATARACT LASER SURGERY: CPT | Mod: RT | Performed by: CLINIC/CENTER

## 2024-05-14 PROCEDURE — G8918 PT W/O PREOP ORDER IV AB PRO: HCPCS | Performed by: CLINIC/CENTER

## 2025-05-06 ENCOUNTER — DOCTOR'S OFFICE (OUTPATIENT)
Dept: URBAN - NONMETROPOLITAN AREA CLINIC 1 | Facility: CLINIC | Age: 70
Setting detail: OPHTHALMOLOGY
End: 2025-05-06
Payer: MEDICARE

## 2025-05-06 DIAGNOSIS — E11.3293: ICD-10-CM

## 2025-05-06 DIAGNOSIS — H44.30: ICD-10-CM

## 2025-05-06 DIAGNOSIS — Z79.84: ICD-10-CM

## 2025-05-06 DIAGNOSIS — H35.373: ICD-10-CM

## 2025-05-06 PROCEDURE — 92134 CPTRZ OPH DX IMG PST SGM RTA: CPT | Performed by: OPHTHALMOLOGY

## 2025-05-06 PROCEDURE — 92014 COMPRE OPH EXAM EST PT 1/>: CPT | Performed by: OPHTHALMOLOGY

## 2025-05-06 ASSESSMENT — REFRACTION_AUTOREFRACTION
OS_CYLINDER: -0.75
OS_AXIS: 043
OD_SPHERE: +1.00
OD_CYLINDER: -0.75
OS_SPHERE: +0.25
OD_AXIS: 085

## 2025-05-06 ASSESSMENT — KERATOMETRY
OS_AXISANGLE_DEGREES: 116
OD_K2POWER_DIOPTERS: 38.25
OD_K1POWER_DIOPTERS: 37.50
OS_K2POWER_DIOPTERS: 39.25
OD_AXISANGLE_DEGREES: 041
OS_K1POWER_DIOPTERS: 38.50

## 2025-05-06 ASSESSMENT — CONFRONTATIONAL VISUAL FIELD TEST (CVF)
OD_FINDINGS: FULL
OS_FINDINGS: FULL

## 2025-05-06 ASSESSMENT — REFRACTION_CURRENTRX
OD_OVR_VA: 20/
OS_OVR_VA: 20/
OS_ADD: +1.75
OD_ADD: +1.75

## 2025-05-06 ASSESSMENT — VISUAL ACUITY
OS_BCVA: 20/30-2
OD_BCVA: 20/60+1